# Patient Record
Sex: FEMALE | Race: BLACK OR AFRICAN AMERICAN | NOT HISPANIC OR LATINO
[De-identification: names, ages, dates, MRNs, and addresses within clinical notes are randomized per-mention and may not be internally consistent; named-entity substitution may affect disease eponyms.]

---

## 2018-09-18 ENCOUNTER — APPOINTMENT (OUTPATIENT)
Dept: SURGERY | Facility: CLINIC | Age: 68
End: 2018-09-18
Payer: MEDICARE

## 2018-09-18 VITALS
HEIGHT: 61 IN | TEMPERATURE: 97.9 F | BODY MASS INDEX: 27.19 KG/M2 | SYSTOLIC BLOOD PRESSURE: 137 MMHG | DIASTOLIC BLOOD PRESSURE: 84 MMHG | WEIGHT: 144 LBS | HEART RATE: 81 BPM

## 2018-09-18 PROCEDURE — 99205 OFFICE O/P NEW HI 60 MIN: CPT

## 2018-10-01 ENCOUNTER — OUTPATIENT (OUTPATIENT)
Dept: OUTPATIENT SERVICES | Facility: HOSPITAL | Age: 68
LOS: 1 days | End: 2018-10-01
Payer: MEDICARE

## 2018-10-01 VITALS
TEMPERATURE: 98 F | OXYGEN SATURATION: 100 % | HEIGHT: 57 IN | RESPIRATION RATE: 16 BRPM | SYSTOLIC BLOOD PRESSURE: 124 MMHG | WEIGHT: 145.95 LBS | HEART RATE: 75 BPM | DIASTOLIC BLOOD PRESSURE: 81 MMHG

## 2018-10-01 DIAGNOSIS — Z01.818 ENCOUNTER FOR OTHER PREPROCEDURAL EXAMINATION: ICD-10-CM

## 2018-10-01 DIAGNOSIS — Z98.890 OTHER SPECIFIED POSTPROCEDURAL STATES: Chronic | ICD-10-CM

## 2018-10-01 DIAGNOSIS — D24.2 BENIGN NEOPLASM OF LEFT BREAST: ICD-10-CM

## 2018-10-01 DIAGNOSIS — I10 ESSENTIAL (PRIMARY) HYPERTENSION: ICD-10-CM

## 2018-10-01 DIAGNOSIS — Z87.39 PERSONAL HISTORY OF OTHER DISEASES OF THE MUSCULOSKELETAL SYSTEM AND CONNECTIVE TISSUE: Chronic | ICD-10-CM

## 2018-10-01 DIAGNOSIS — N60.12 DIFFUSE CYSTIC MASTOPATHY OF LEFT BREAST: Chronic | ICD-10-CM

## 2018-10-01 PROCEDURE — 86901 BLOOD TYPING SEROLOGIC RH(D): CPT

## 2018-10-01 PROCEDURE — G0463: CPT

## 2018-10-01 PROCEDURE — 86900 BLOOD TYPING SEROLOGIC ABO: CPT

## 2018-10-01 PROCEDURE — 86850 RBC ANTIBODY SCREEN: CPT

## 2018-10-01 NOTE — H&P PST ADULT - NEGATIVE GENERAL SYMPTOMS
no anorexia/no polyuria/no fatigue/no weight loss/no polyphagia/no malaise/no chills/no fever/no polydipsia

## 2018-10-01 NOTE — H&P PST ADULT - PSH
Ductal papillomatosis of breast, left  s/p excision 1980's  H/O breast surgery  right breast calcifications removed, breast marker placed over 20 years ago  H/O surgical biopsy  left breast 7/2018  History of scoliosis  s/p surgical repair childhood

## 2018-10-01 NOTE — H&P PST ADULT - NEGATIVE CARDIOVASCULAR SYMPTOMS
no peripheral edema/no paroxysmal nocturnal dyspnea/no chest pain/no orthopnea/no palpitations/no claudication/no dyspnea on exertion

## 2018-10-01 NOTE — H&P PST ADULT - MUSCULOSKELETAL
negative No joint pain, swelling or deformity; no limitation of movement detailed exam Left hip/decreased ROM due to pain details…

## 2018-10-01 NOTE — H&P PST ADULT - ASSESSMENT
69 y/o female with PMH of hypertension, HLD, LLE sciatica diagnosed with benign neoplasm of left breast scheduled for left breast needle localization and excision of left breast mass 10/4/2018

## 2018-10-01 NOTE — H&P PST ADULT - NEUROLOGICAL
negative Alert & oriented; no sensory, motor or coordination deficits, normal reflexes detailed exam details…

## 2018-10-01 NOTE — H&P PST ADULT - PROBLEM SELECTOR PLAN 1
Scheduled for left breast needle localization and excision of left breast mass 10/4/2018. Preoperative instructions discussed and given to patient. Verbalized understanding of same

## 2018-10-01 NOTE — H&P PST ADULT - RS GEN PE MLT RESP DETAILS PC
respirations non-labored/clear to auscultation bilaterally/no rhonchi/good air movement/airway patent/no subcutaneous emphysema/no chest wall tenderness/normal/no intercostal retractions/no rales/breath sounds equal/no wheezes no chest wall tenderness/no intercostal retractions/no rales/clear to auscultation bilaterally/breath sounds equal/respirations non-labored/no wheezes/good air movement/airway patent/no rhonchi/no subcutaneous emphysema

## 2018-10-01 NOTE — H&P PST ADULT - HISTORY OF PRESENT ILLNESS
67 y/o female with PMH of hypertension and HLD is here today for presurgical evaluation. She was diagnosed benign neoplasm of left breast and is scheduled for left breast needle localization and excision of left breast mass 10/4/2018 69 y/o female with PMH of hypertension and HLD is here today for presurgical evaluation. She was diagnosed with benign neoplasm of left breast and is scheduled for left breast needle localization and excision of left breast mass 10/4/2018

## 2018-10-01 NOTE — H&P PST ADULT - PMH
Benign neoplasm breast, left    Breast calcification seen on mammogram  right breast  Essential hypertension    Hyperlipidemia, unspecified hyperlipidemia type    Sciatica of left side    Scoliosis, unspecified scoliosis type, unspecified spinal region

## 2018-10-01 NOTE — H&P PST ADULT - NSANTHOSAYNRD_GEN_A_CORE
No. WINSOME screening performed.  STOP BANG Legend: 0-2 = LOW Risk; 3-4 = INTERMEDIATE Risk; 5-8 = HIGH Risk

## 2018-10-01 NOTE — H&P PST ADULT - PROBLEM SELECTOR PLAN 2
Instructed to take antihypertensive medication (s) as prescribed with a sip of water the morning of surgery. Follow up with PCP/Specialist post-surgery for management of hypertension and other chronic conditions Instructed to take antihypertensive medication (s) as prescribed with a sip of water the day of surgery. Follow up with PCP/Specialist post-surgery for management of hypertension and other chronic conditions

## 2018-10-04 ENCOUNTER — TRANSCRIPTION ENCOUNTER (OUTPATIENT)
Age: 68
End: 2018-10-04

## 2018-10-05 ENCOUNTER — OUTPATIENT (OUTPATIENT)
Dept: OUTPATIENT SERVICES | Facility: HOSPITAL | Age: 68
LOS: 1 days | End: 2018-10-05
Payer: MEDICARE

## 2018-10-05 ENCOUNTER — RESULT REVIEW (OUTPATIENT)
Age: 68
End: 2018-10-05

## 2018-10-05 VITALS
DIASTOLIC BLOOD PRESSURE: 76 MMHG | OXYGEN SATURATION: 96 % | TEMPERATURE: 97 F | WEIGHT: 145.95 LBS | HEART RATE: 71 BPM | SYSTOLIC BLOOD PRESSURE: 117 MMHG | HEIGHT: 57 IN | RESPIRATION RATE: 16 BRPM

## 2018-10-05 VITALS
SYSTOLIC BLOOD PRESSURE: 128 MMHG | OXYGEN SATURATION: 98 % | RESPIRATION RATE: 16 BRPM | HEART RATE: 67 BPM | DIASTOLIC BLOOD PRESSURE: 71 MMHG | TEMPERATURE: 98 F

## 2018-10-05 DIAGNOSIS — Z98.890 OTHER SPECIFIED POSTPROCEDURAL STATES: Chronic | ICD-10-CM

## 2018-10-05 DIAGNOSIS — Z87.39 PERSONAL HISTORY OF OTHER DISEASES OF THE MUSCULOSKELETAL SYSTEM AND CONNECTIVE TISSUE: Chronic | ICD-10-CM

## 2018-10-05 DIAGNOSIS — N60.12 DIFFUSE CYSTIC MASTOPATHY OF LEFT BREAST: Chronic | ICD-10-CM

## 2018-10-05 DIAGNOSIS — Z01.818 ENCOUNTER FOR OTHER PREPROCEDURAL EXAMINATION: ICD-10-CM

## 2018-10-05 DIAGNOSIS — D24.2 BENIGN NEOPLASM OF LEFT BREAST: ICD-10-CM

## 2018-10-05 PROCEDURE — 19281 PERQ DEVICE BREAST 1ST IMAG: CPT

## 2018-10-05 PROCEDURE — 19125 EXCISION BREAST LESION: CPT | Mod: LT

## 2018-10-05 PROCEDURE — 86850 RBC ANTIBODY SCREEN: CPT

## 2018-10-05 PROCEDURE — 76098 X-RAY EXAM SURGICAL SPECIMEN: CPT | Mod: 26

## 2018-10-05 PROCEDURE — 88307 TISSUE EXAM BY PATHOLOGIST: CPT | Mod: 26

## 2018-10-05 PROCEDURE — 86900 BLOOD TYPING SEROLOGIC ABO: CPT

## 2018-10-05 PROCEDURE — 19281 PERQ DEVICE BREAST 1ST IMAG: CPT | Mod: LT

## 2018-10-05 PROCEDURE — 86901 BLOOD TYPING SEROLOGIC RH(D): CPT

## 2018-10-05 PROCEDURE — 88307 TISSUE EXAM BY PATHOLOGIST: CPT

## 2018-10-05 PROCEDURE — ZZZZZ: CPT

## 2018-10-05 PROCEDURE — 76098 X-RAY EXAM SURGICAL SPECIMEN: CPT

## 2018-10-05 RX ORDER — SODIUM CHLORIDE 9 MG/ML
1000 INJECTION, SOLUTION INTRAVENOUS
Qty: 0 | Refills: 0 | Status: DISCONTINUED | OUTPATIENT
Start: 2018-10-05 | End: 2018-10-08

## 2018-10-05 RX ORDER — SODIUM CHLORIDE 9 MG/ML
3 INJECTION INTRAMUSCULAR; INTRAVENOUS; SUBCUTANEOUS EVERY 8 HOURS
Qty: 0 | Refills: 0 | Status: DISCONTINUED | OUTPATIENT
Start: 2018-10-05 | End: 2018-10-05

## 2018-10-05 RX ORDER — HYDROMORPHONE HYDROCHLORIDE 2 MG/ML
0.5 INJECTION INTRAMUSCULAR; INTRAVENOUS; SUBCUTANEOUS
Qty: 0 | Refills: 0 | Status: DISCONTINUED | OUTPATIENT
Start: 2018-10-05 | End: 2018-10-08

## 2018-10-05 RX ORDER — SODIUM CHLORIDE 9 MG/ML
1000 INJECTION, SOLUTION INTRAVENOUS
Qty: 0 | Refills: 0 | Status: DISCONTINUED | OUTPATIENT
Start: 2018-10-05 | End: 2018-10-13

## 2018-10-05 RX ORDER — ONDANSETRON 8 MG/1
4 TABLET, FILM COATED ORAL ONCE
Qty: 0 | Refills: 0 | Status: DISCONTINUED | OUTPATIENT
Start: 2018-10-05 | End: 2018-10-08

## 2018-10-05 RX ORDER — OXYCODONE AND ACETAMINOPHEN 5; 325 MG/1; MG/1
1 TABLET ORAL EVERY 4 HOURS
Qty: 0 | Refills: 0 | Status: DISCONTINUED | OUTPATIENT
Start: 2018-10-05 | End: 2018-10-05

## 2018-10-05 RX ORDER — ACETAMINOPHEN 500 MG
1000 TABLET ORAL ONCE
Qty: 0 | Refills: 0 | Status: DISCONTINUED | OUTPATIENT
Start: 2018-10-05 | End: 2018-10-08

## 2018-10-05 RX ADMIN — SODIUM CHLORIDE 3 MILLILITER(S): 9 INJECTION INTRAMUSCULAR; INTRAVENOUS; SUBCUTANEOUS at 11:11

## 2018-10-05 NOTE — BRIEF OPERATIVE NOTE - PROCEDURE
<<-----Click on this checkbox to enter Procedure Breast mass, local excision  10/05/2018  with needle localization  Active  DPATERNOSTER

## 2018-10-05 NOTE — ASU DISCHARGE PLAN (ADULT/PEDIATRIC). - DRESSING FT
keep dressing dry x2 days then may remove and shower. leave white strips on skin until they fall off

## 2018-10-05 NOTE — ASU DISCHARGE PLAN (ADULT/PEDIATRIC). - NOTIFY
Bleeding that does not stop Swelling that continues/Bleeding that does not stop/Fever greater than 101

## 2018-10-05 NOTE — ASU DISCHARGE PLAN (ADULT/PEDIATRIC). - MEDICATION SUMMARY - MEDICATIONS TO TAKE
I will START or STAY ON the medications listed below when I get home from the hospital:    Percocet 5/325 oral tablet  -- 1 tab(s) by mouth every 6 hours MDD:4  -- Caution federal law prohibits the transfer of this drug to any person other  than the person for whom it was prescribed.  May cause drowsiness.  Alcohol may intensify this effect.  Use care when operating dangerous machinery.  This prescription cannot be refilled.  This product contains acetaminophen.  Do not use  with any other product containing acetaminophen to prevent possible liver damage.  Using more of this medication than prescribed may cause serious breathing problems.    -- Indication: For pain    ramipril 5 mg oral capsule  -- 1 cap(s) by mouth once a day  -- Indication: For as previously perscribed    Lipitor 10 mg oral tablet  -- 1 tab(s) by mouth once a day  -- Indication: For as previously perscribed    hydroCHLOROthiazide 25 mg oral tablet  -- 1 tab(s) by mouth once a day  -- Indication: For as previously perscribed    potassium chloride 10 mEq oral capsule, extended release  -- 1 cap(s) by mouth once a day  -- Indication: For as previously perscribed

## 2018-10-09 PROBLEM — Z80.3 FAMILY HISTORY OF BREAST CANCER: Status: ACTIVE | Noted: 2018-09-18

## 2018-10-09 PROBLEM — D24.2 BENIGN NEOPLASM OF LEFT BREAST: Chronic | Status: ACTIVE | Noted: 2018-10-01

## 2018-10-09 PROBLEM — Z63.4 WIDOWED: Status: ACTIVE | Noted: 2018-09-18

## 2018-10-09 PROBLEM — Z86.79 HISTORY OF HYPERTENSION: Status: RESOLVED | Noted: 2018-09-18 | Resolved: 2018-10-09

## 2018-10-09 PROBLEM — R92.1 MAMMOGRAPHIC CALCIFICATION FOUND ON DIAGNOSTIC IMAGING OF BREAST: Chronic | Status: ACTIVE | Noted: 2018-10-01

## 2018-10-09 PROBLEM — I10 ESSENTIAL (PRIMARY) HYPERTENSION: Chronic | Status: ACTIVE | Noted: 2018-10-01

## 2018-10-09 PROBLEM — Z86.39 HISTORY OF HIGH CHOLESTEROL: Status: RESOLVED | Noted: 2018-09-18 | Resolved: 2018-10-09

## 2018-10-09 PROBLEM — M41.9 SCOLIOSIS, UNSPECIFIED: Chronic | Status: ACTIVE | Noted: 2018-10-01

## 2018-10-09 PROBLEM — E78.5 HYPERLIPIDEMIA, UNSPECIFIED: Chronic | Status: ACTIVE | Noted: 2018-10-01

## 2018-10-09 PROBLEM — Z82.0 FAMILY HISTORY OF ALZHEIMER'S DISEASE: Status: ACTIVE | Noted: 2018-09-18

## 2018-10-09 PROBLEM — M54.32 SCIATICA, LEFT SIDE: Chronic | Status: ACTIVE | Noted: 2018-10-01

## 2018-10-10 LAB — SURGICAL PATHOLOGY STUDY: SIGNIFICANT CHANGE UP

## 2018-10-16 ENCOUNTER — APPOINTMENT (OUTPATIENT)
Dept: SURGERY | Facility: CLINIC | Age: 68
End: 2018-10-16
Payer: MEDICARE

## 2018-10-16 DIAGNOSIS — Z86.79 PERSONAL HISTORY OF OTHER DISEASES OF THE CIRCULATORY SYSTEM: ICD-10-CM

## 2018-10-16 DIAGNOSIS — Z86.39 PERSONAL HISTORY OF OTHER ENDOCRINE, NUTRITIONAL AND METABOLIC DISEASE: ICD-10-CM

## 2018-10-16 DIAGNOSIS — Z82.0 FAMILY HISTORY OF EPILEPSY AND OTHER DISEASES OF THE NERVOUS SYSTEM: ICD-10-CM

## 2018-10-16 DIAGNOSIS — Z80.3 FAMILY HISTORY OF MALIGNANT NEOPLASM OF BREAST: ICD-10-CM

## 2018-10-16 DIAGNOSIS — Z63.4 DISAPPEARANCE AND DEATH OF FAMILY MEMBER: ICD-10-CM

## 2018-10-16 PROCEDURE — 99024 POSTOP FOLLOW-UP VISIT: CPT

## 2018-10-16 RX ORDER — HYDROCHLOROTHIAZIDE 12.5 MG/1
TABLET ORAL
Refills: 0 | Status: ACTIVE | COMMUNITY

## 2018-10-16 RX ORDER — RAMIPRIL 5 MG/1
CAPSULE ORAL
Refills: 0 | Status: ACTIVE | COMMUNITY

## 2018-10-16 RX ORDER — ATORVASTATIN CALCIUM 80 MG/1
TABLET, FILM COATED ORAL
Refills: 0 | Status: ACTIVE | COMMUNITY

## 2018-10-16 SDOH — SOCIAL STABILITY - SOCIAL INSECURITY: DISSAPEARANCE AND DEATH OF FAMILY MEMBER: Z63.4

## 2019-01-15 ENCOUNTER — APPOINTMENT (OUTPATIENT)
Dept: SURGERY | Facility: CLINIC | Age: 69
End: 2019-01-15
Payer: MEDICARE

## 2019-01-15 VITALS
DIASTOLIC BLOOD PRESSURE: 84 MMHG | BODY MASS INDEX: 26.62 KG/M2 | HEIGHT: 61 IN | HEART RATE: 73 BPM | SYSTOLIC BLOOD PRESSURE: 150 MMHG | WEIGHT: 141 LBS | TEMPERATURE: 97 F

## 2019-01-15 PROCEDURE — 99213 OFFICE O/P EST LOW 20 MIN: CPT

## 2019-01-15 NOTE — HISTORY OF PRESENT ILLNESS
[de-identified] : Patient is s/p excision of left breast mass with needle localization on 10/05/2018. today patient offers no complaints. patient reports no fever, chills,  or  pain.  Surgical wound healed well. patient reports no new medical events. \par

## 2019-01-15 NOTE — ASSESSMENT
[FreeTextEntry1] : Impression: breast mass \par Patient is s/p excision of left breast mass with needle localization on 10/05/2018. today patient offers no complaints. patient reports no fever, chills,  or  pain.  Surgical wound healed well. patient reports no new medical events.  patient will return to us in 3 months and we will make arrangements for her breast imaging. \par

## 2019-01-15 NOTE — PLAN
[FreeTextEntry1] : \par patient will follow up in 3 months or if needed. Warning signs, follow up, and restrictions were discussed with the patient.

## 2019-01-15 NOTE — PHYSICAL EXAM
[Alert] : alert [Oriented to Person] : oriented to person [Oriented to Place] : oriented to place [Oriented to Time] : oriented to time [Calm] : calm [de-identified] : The patient is alert, well-groomed, and cheerful. [de-identified] :  Neck supple. Trachea midline. Thyroid isthmus barely palpable, lobes not felt. [de-identified] : Thorax symmetric with good excursion. Lungs resonant. Breath sounds vesicular with no added sounds. [de-identified] : left breast Wound healed  well.

## 2019-09-10 ENCOUNTER — OUTPATIENT (OUTPATIENT)
Dept: OUTPATIENT SERVICES | Facility: HOSPITAL | Age: 69
LOS: 1 days | End: 2019-09-10
Payer: MEDICARE

## 2019-09-10 ENCOUNTER — APPOINTMENT (OUTPATIENT)
Dept: CT IMAGING | Facility: IMAGING CENTER | Age: 69
End: 2019-09-10
Payer: MEDICARE

## 2019-09-10 DIAGNOSIS — Z98.890 OTHER SPECIFIED POSTPROCEDURAL STATES: Chronic | ICD-10-CM

## 2019-09-10 DIAGNOSIS — Z87.39 PERSONAL HISTORY OF OTHER DISEASES OF THE MUSCULOSKELETAL SYSTEM AND CONNECTIVE TISSUE: Chronic | ICD-10-CM

## 2019-09-10 DIAGNOSIS — N60.12 DIFFUSE CYSTIC MASTOPATHY OF LEFT BREAST: Chronic | ICD-10-CM

## 2019-09-10 DIAGNOSIS — Z00.8 ENCOUNTER FOR OTHER GENERAL EXAMINATION: ICD-10-CM

## 2019-09-10 PROCEDURE — 74177 CT ABD & PELVIS W/CONTRAST: CPT | Mod: 26

## 2019-09-10 PROCEDURE — 74177 CT ABD & PELVIS W/CONTRAST: CPT

## 2019-09-16 ENCOUNTER — RESULT REVIEW (OUTPATIENT)
Age: 69
End: 2019-09-16

## 2019-09-19 ENCOUNTER — APPOINTMENT (OUTPATIENT)
Dept: GYNECOLOGIC ONCOLOGY | Facility: CLINIC | Age: 69
End: 2019-09-19
Payer: MEDICARE

## 2019-09-19 VITALS
SYSTOLIC BLOOD PRESSURE: 158 MMHG | WEIGHT: 137.25 LBS | BODY MASS INDEX: 25.91 KG/M2 | DIASTOLIC BLOOD PRESSURE: 80 MMHG | HEART RATE: 84 BPM | HEIGHT: 61 IN

## 2019-09-19 DIAGNOSIS — N85.8 OTHER SPECIFIED NONINFLAMMATORY DISORDERS OF UTERUS: ICD-10-CM

## 2019-09-19 PROCEDURE — 99205 OFFICE O/P NEW HI 60 MIN: CPT

## 2019-09-23 ENCOUNTER — APPOINTMENT (OUTPATIENT)
Dept: SURGERY | Facility: CLINIC | Age: 69
End: 2019-09-23
Payer: MEDICARE

## 2019-09-23 VITALS
HEIGHT: 61 IN | BODY MASS INDEX: 25.86 KG/M2 | OXYGEN SATURATION: 100 % | DIASTOLIC BLOOD PRESSURE: 94 MMHG | HEART RATE: 78 BPM | TEMPERATURE: 97.5 F | WEIGHT: 137 LBS | SYSTOLIC BLOOD PRESSURE: 161 MMHG

## 2019-09-23 DIAGNOSIS — Z80.3 FAMILY HISTORY OF MALIGNANT NEOPLASM OF BREAST: ICD-10-CM

## 2019-09-23 PROCEDURE — 99213 OFFICE O/P EST LOW 20 MIN: CPT

## 2019-09-23 NOTE — DATA REVIEWED
[FreeTextEntry1] : ADRIEL ANDRADE                         1\par \par \par \par Surgical Consult Report\par \par \par \par \par Final Diagnosis\par Submitting Institution:  [facility]\par Date of Procedure:  08/22/2019\par \par 1. Uterus, endometrium, curettings\par - Minute fragments of markedly atypical glandular\par epithelium, highly suspicious for adenocarcinoma, see note\par \par Note: Provided outside immunohistochemical staining for p53\par demonstrates wild type nuclear expression.\par \par 2. Uterus, endometrium, polyp, polypectomy\par - Minute fragments of markedly atypical glandular\par epithelium, highly suspicious for adenocarcinoma, see note\par \par Note: Provided outside immunohistochemical staining for p53\par demonstrates wild type nuclear expression.\par \par Verified by: Joseph Trujillo MD\par (Electronic Signature)\par Reported on: 09/17/19 12:38 EDT, [address] [place] [address], [place], [place]\par [address]\par _________________________________________________________________\par \par Clinical History\par B: Polyp. C: Fibroid.

## 2019-09-23 NOTE — PHYSICAL EXAM
[Alert] : alert [Oriented to Person] : oriented to person [Oriented to Time] : oriented to time [Oriented to Place] : oriented to place [Calm] : calm [de-identified] : The patient is alert, well-groomed, and cheerful.\par   [de-identified] :   anicteric.  Nasal mucosa pink, septum midline. Oral mucosa pink.  Tongue midline, Pharynx without exudates.\par   [de-identified] :  Neck supple. Trachea midline. Thyroid isthmus barely palpable, lobes not felt.\par   [de-identified] : left breast scar with retraction. No chest deformity, asymmetry. Normal contours. No nodules, masses, tenderness, or axillary adenopathy. No nipple discharge.\par

## 2019-09-23 NOTE — HISTORY OF PRESENT ILLNESS
[de-identified] : Patient is s/p excision of left breast mass with needle localization on 10/05/2018. pathology results were consistent with Fibrocystic changes with intraductal papillary hyperplasia.   patient presenting today for a breast exam and to discuss the results of her recent  breast imaging. Patient had  right breast  Mammogram on 08/28/2019 Deemed BIRADS 4 category.  Patient denies any trauma and she has no nipple discharge. Patient denies any fever, night sweats or loss of appetite.    There is  family history of breast carcinoma in her mother.   Patient is on no hormonal replacement therapy.  [de-identified] : Patient is s/p  Uterus, endometrium  polypectomy on 09/16/2019. pathology results are consistent with atypical glandular\par epithelium, highly suspicious for adenocarcinoma\par

## 2019-09-23 NOTE — PLAN
[FreeTextEntry1] : Patient is presenting for a follow up visit.  Patient is doing well. Results of her breast imaging and physical examination findings were discussed in details.   Patient was advised to have   Right     breast stereotactic biopsy and return after the tests. Importance of monthly self-breast examination was reinforced.  Patient's questions and concerns addressed to patient's satisfaction.\par \par

## 2019-09-24 ENCOUNTER — OUTPATIENT (OUTPATIENT)
Dept: OUTPATIENT SERVICES | Facility: HOSPITAL | Age: 69
LOS: 1 days | End: 2019-09-24
Payer: MEDICARE

## 2019-09-24 VITALS
SYSTOLIC BLOOD PRESSURE: 130 MMHG | DIASTOLIC BLOOD PRESSURE: 80 MMHG | HEART RATE: 70 BPM | HEIGHT: 61 IN | RESPIRATION RATE: 16 BRPM | WEIGHT: 136.91 LBS | TEMPERATURE: 97 F | OXYGEN SATURATION: 97 %

## 2019-09-24 DIAGNOSIS — Z87.39 PERSONAL HISTORY OF OTHER DISEASES OF THE MUSCULOSKELETAL SYSTEM AND CONNECTIVE TISSUE: Chronic | ICD-10-CM

## 2019-09-24 DIAGNOSIS — Z98.890 OTHER SPECIFIED POSTPROCEDURAL STATES: Chronic | ICD-10-CM

## 2019-09-24 DIAGNOSIS — N85.9 NONINFLAMMATORY DISORDER OF UTERUS, UNSPECIFIED: ICD-10-CM

## 2019-09-24 DIAGNOSIS — N85.8 OTHER SPECIFIED NONINFLAMMATORY DISORDERS OF UTERUS: ICD-10-CM

## 2019-09-24 DIAGNOSIS — N60.12 DIFFUSE CYSTIC MASTOPATHY OF LEFT BREAST: Chronic | ICD-10-CM

## 2019-09-24 LAB
ALBUMIN SERPL ELPH-MCNC: 4.3 G/DL — SIGNIFICANT CHANGE UP (ref 3.3–5)
ALP SERPL-CCNC: 87 U/L — SIGNIFICANT CHANGE UP (ref 40–120)
ALT FLD-CCNC: 7 U/L — SIGNIFICANT CHANGE UP (ref 4–33)
ANION GAP SERPL CALC-SCNC: 17 MMO/L — HIGH (ref 7–14)
AST SERPL-CCNC: 19 U/L — SIGNIFICANT CHANGE UP (ref 4–32)
BILIRUB SERPL-MCNC: 0.5 MG/DL — SIGNIFICANT CHANGE UP (ref 0.2–1.2)
BLD GP AB SCN SERPL QL: NEGATIVE — SIGNIFICANT CHANGE UP
BUN SERPL-MCNC: 14 MG/DL — SIGNIFICANT CHANGE UP (ref 7–23)
CALCIUM SERPL-MCNC: 9.9 MG/DL — SIGNIFICANT CHANGE UP (ref 8.4–10.5)
CHLORIDE SERPL-SCNC: 99 MMOL/L — SIGNIFICANT CHANGE UP (ref 98–107)
CO2 SERPL-SCNC: 27 MMOL/L — SIGNIFICANT CHANGE UP (ref 22–31)
CREAT SERPL-MCNC: 0.77 MG/DL — SIGNIFICANT CHANGE UP (ref 0.5–1.3)
GLUCOSE SERPL-MCNC: 82 MG/DL — SIGNIFICANT CHANGE UP (ref 70–99)
HBA1C BLD-MCNC: 5.7 % — HIGH (ref 4–5.6)
HCT VFR BLD CALC: 42.4 % — SIGNIFICANT CHANGE UP (ref 34.5–45)
HGB BLD-MCNC: 13 G/DL — SIGNIFICANT CHANGE UP (ref 11.5–15.5)
MCHC RBC-ENTMCNC: 26.9 PG — LOW (ref 27–34)
MCHC RBC-ENTMCNC: 30.7 % — LOW (ref 32–36)
MCV RBC AUTO: 87.6 FL — SIGNIFICANT CHANGE UP (ref 80–100)
NRBC # FLD: 0 K/UL — SIGNIFICANT CHANGE UP (ref 0–0)
PLATELET # BLD AUTO: 210 K/UL — SIGNIFICANT CHANGE UP (ref 150–400)
PMV BLD: 12.4 FL — SIGNIFICANT CHANGE UP (ref 7–13)
POTASSIUM SERPL-MCNC: 3.4 MMOL/L — LOW (ref 3.5–5.3)
POTASSIUM SERPL-SCNC: 3.4 MMOL/L — LOW (ref 3.5–5.3)
PROT SERPL-MCNC: 8.3 G/DL — SIGNIFICANT CHANGE UP (ref 6–8.3)
RBC # BLD: 4.84 M/UL — SIGNIFICANT CHANGE UP (ref 3.8–5.2)
RBC # FLD: 14.3 % — SIGNIFICANT CHANGE UP (ref 10.3–14.5)
RH IG SCN BLD-IMP: POSITIVE — SIGNIFICANT CHANGE UP
SODIUM SERPL-SCNC: 143 MMOL/L — SIGNIFICANT CHANGE UP (ref 135–145)
WBC # BLD: 2.96 K/UL — LOW (ref 3.8–10.5)
WBC # FLD AUTO: 2.96 K/UL — LOW (ref 3.8–10.5)

## 2019-09-24 PROCEDURE — 93010 ELECTROCARDIOGRAM REPORT: CPT

## 2019-09-24 RX ORDER — SODIUM CHLORIDE 9 MG/ML
3 INJECTION INTRAMUSCULAR; INTRAVENOUS; SUBCUTANEOUS EVERY 8 HOURS
Refills: 0 | Status: DISCONTINUED | OUTPATIENT
Start: 2019-10-01 | End: 2019-10-20

## 2019-09-24 NOTE — H&P PST ADULT - NSICDXPASTMEDICALHX_GEN_ALL_CORE_FT
PAST MEDICAL HISTORY:  Benign neoplasm breast, left     Breast calcification seen on mammogram right breast    Essential hypertension     Hyperlipidemia, unspecified hyperlipidemia type     Sciatica of left side     Scoliosis, unspecified scoliosis type, unspecified spinal region

## 2019-09-24 NOTE — H&P PST ADULT - HISTORY OF PRESENT ILLNESS
70 y/o female s/p recent GYN visit.  Polyp discovered on cervix, cancer cells discovered on biopsy per pt.  Scheduled for Robotic Assisted Total Laparoscopic hysterectomy BSO, possible staging 10/1/19.

## 2019-09-24 NOTE — H&P PST ADULT - NSICDXFAMILYHX_GEN_ALL_CORE_FT
FAMILY HISTORY:  Mother  Still living? No  Family history of Alzheimer's disease, Age at diagnosis: Age Unknown

## 2019-09-24 NOTE — H&P PST ADULT - NEGATIVE GENERAL SYMPTOMS
no polydipsia/no fever/no chills/no anorexia/no polyphagia/no fatigue/no polyuria/no malaise/no weight loss

## 2019-09-24 NOTE — H&P PST ADULT - NSICDXPASTSURGICALHX_GEN_ALL_CORE_FT
PAST SURGICAL HISTORY:  Ductal papillomatosis of breast, left s/p excision 1980's    H/O breast surgery right breast calcifications removed, breast marker placed over 20 years ago    H/O surgical biopsy left breast 7/2018    History of scoliosis s/p surgical repair childhood

## 2019-09-24 NOTE — H&P PST ADULT - ASSESSMENT
68 y/o female s/p recent GYN visit.  Polyp discovered on cervix, cancer cells discovered on biopsy per pt.  Scheduled for Robotic Assisted Total Laparoscopic hysterectomy BSO, possible staging 10/1/19.

## 2019-09-24 NOTE — H&P PST ADULT - NSICDXPROBLEM_GEN_ALL_CORE_FT
PROBLEM DIAGNOSES  Problem: Disorder of uterus  Assessment and Plan: Robotic Assisted Total Laparoscopic hysterectomy BSO, possible staging 10/1/19.   CBC cMP HgbA1C t&S EKG  Preop instructions and antibacterial soap given and explained (verbal and written), with teach back.   Pt reports she was seen by PMD for pre-op eval, requested on chart

## 2019-09-24 NOTE — H&P PST ADULT - NEGATIVE CARDIOVASCULAR SYMPTOMS
no chest pain/no paroxysmal nocturnal dyspnea/no dyspnea on exertion/no palpitations/no orthopnea/no peripheral edema/no claudication

## 2019-09-30 ENCOUNTER — TRANSCRIPTION ENCOUNTER (OUTPATIENT)
Age: 69
End: 2019-09-30

## 2019-10-01 ENCOUNTER — APPOINTMENT (OUTPATIENT)
Dept: GYNECOLOGIC ONCOLOGY | Facility: HOSPITAL | Age: 69
End: 2019-10-01

## 2019-10-01 ENCOUNTER — RESULT REVIEW (OUTPATIENT)
Age: 69
End: 2019-10-01

## 2019-10-01 ENCOUNTER — OUTPATIENT (OUTPATIENT)
Dept: OUTPATIENT SERVICES | Facility: HOSPITAL | Age: 69
LOS: 1 days | Discharge: HOME | End: 2019-10-01
Payer: MEDICARE

## 2019-10-01 VITALS
SYSTOLIC BLOOD PRESSURE: 128 MMHG | HEART RATE: 72 BPM | OXYGEN SATURATION: 98 % | RESPIRATION RATE: 16 BRPM | DIASTOLIC BLOOD PRESSURE: 82 MMHG

## 2019-10-01 VITALS
TEMPERATURE: 99 F | RESPIRATION RATE: 18 BRPM | HEART RATE: 77 BPM | DIASTOLIC BLOOD PRESSURE: 76 MMHG | WEIGHT: 136.91 LBS | OXYGEN SATURATION: 97 % | SYSTOLIC BLOOD PRESSURE: 126 MMHG | HEIGHT: 61 IN

## 2019-10-01 DIAGNOSIS — N60.12 DIFFUSE CYSTIC MASTOPATHY OF LEFT BREAST: Chronic | ICD-10-CM

## 2019-10-01 DIAGNOSIS — N85.8 OTHER SPECIFIED NONINFLAMMATORY DISORDERS OF UTERUS: ICD-10-CM

## 2019-10-01 DIAGNOSIS — Z87.39 PERSONAL HISTORY OF OTHER DISEASES OF THE MUSCULOSKELETAL SYSTEM AND CONNECTIVE TISSUE: Chronic | ICD-10-CM

## 2019-10-01 DIAGNOSIS — Z98.890 OTHER SPECIFIED POSTPROCEDURAL STATES: Chronic | ICD-10-CM

## 2019-10-01 LAB
GLUCOSE BLDC GLUCOMTR-MCNC: 126 MG/DL — HIGH (ref 70–99)
GLUCOSE BLDC GLUCOMTR-MCNC: 91 MG/DL — SIGNIFICANT CHANGE UP (ref 70–99)
GLUCOSE BLDV-MCNC: 109 MG/DL — HIGH (ref 70–99)
HCT VFR BLD CALC: 36.6 % — SIGNIFICANT CHANGE UP (ref 34.5–45)
HGB BLD-MCNC: 11.8 G/DL — SIGNIFICANT CHANGE UP (ref 11.5–15.5)
MCHC RBC-ENTMCNC: 27.4 PG — SIGNIFICANT CHANGE UP (ref 27–34)
MCHC RBC-ENTMCNC: 32.2 % — SIGNIFICANT CHANGE UP (ref 32–36)
MCV RBC AUTO: 85.1 FL — SIGNIFICANT CHANGE UP (ref 80–100)
NRBC # FLD: 0 K/UL — SIGNIFICANT CHANGE UP (ref 0–0)
PLATELET # BLD AUTO: 185 K/UL — SIGNIFICANT CHANGE UP (ref 150–400)
PMV BLD: 11.9 FL — SIGNIFICANT CHANGE UP (ref 7–13)
POTASSIUM BLDV-SCNC: 4.2 MMOL/L — SIGNIFICANT CHANGE UP (ref 3.4–4.5)
RBC # BLD: 4.3 M/UL — SIGNIFICANT CHANGE UP (ref 3.8–5.2)
RBC # FLD: 14.1 % — SIGNIFICANT CHANGE UP (ref 10.3–14.5)
RH IG SCN BLD-IMP: POSITIVE — SIGNIFICANT CHANGE UP
WBC # BLD: 7.53 K/UL — SIGNIFICANT CHANGE UP (ref 3.8–10.5)
WBC # FLD AUTO: 7.53 K/UL — SIGNIFICANT CHANGE UP (ref 3.8–10.5)

## 2019-10-01 PROCEDURE — 88331 PATH CONSLTJ SURG 1 BLK 1SPC: CPT | Mod: 26

## 2019-10-01 PROCEDURE — 88341 IMHCHEM/IMCYTCHM EA ADD ANTB: CPT | Mod: 26,59

## 2019-10-01 PROCEDURE — 58572 TLH UTERUS OVER 250 G: CPT | Mod: GC

## 2019-10-01 PROCEDURE — 88112 CYTOPATH CELL ENHANCE TECH: CPT | Mod: 26

## 2019-10-01 PROCEDURE — 88342 IMHCHEM/IMCYTCHM 1ST ANTB: CPT | Mod: 26,59

## 2019-10-01 PROCEDURE — 88309 TISSUE EXAM BY PATHOLOGIST: CPT | Mod: 26

## 2019-10-01 PROCEDURE — S2900 ROBOTIC SURGICAL SYSTEM: CPT | Mod: NC

## 2019-10-01 PROCEDURE — 88360 TUMOR IMMUNOHISTOCHEM/MANUAL: CPT | Mod: 26

## 2019-10-01 RX ORDER — SODIUM CHLORIDE 9 MG/ML
1000 INJECTION, SOLUTION INTRAVENOUS
Refills: 0 | Status: DISCONTINUED | OUTPATIENT
Start: 2019-10-01 | End: 2019-10-20

## 2019-10-01 RX ORDER — HYDROMORPHONE HYDROCHLORIDE 2 MG/ML
0.5 INJECTION INTRAMUSCULAR; INTRAVENOUS; SUBCUTANEOUS
Refills: 0 | Status: DISCONTINUED | OUTPATIENT
Start: 2019-10-01 | End: 2019-10-01

## 2019-10-01 RX ORDER — ONDANSETRON 8 MG/1
4 TABLET, FILM COATED ORAL ONCE
Refills: 0 | Status: COMPLETED | OUTPATIENT
Start: 2019-10-01 | End: 2019-10-01

## 2019-10-01 RX ADMIN — ONDANSETRON 4 MILLIGRAM(S): 8 TABLET, FILM COATED ORAL at 19:37

## 2019-10-01 RX ADMIN — SODIUM CHLORIDE 125 MILLILITER(S): 9 INJECTION, SOLUTION INTRAVENOUS at 13:24

## 2019-10-01 NOTE — ASU DISCHARGE PLAN (ADULT/PEDIATRIC) - CARE PROVIDER_API CALL
Antoinette Terrazas (MD)  Gynecologic Oncology; Obstetrics and Gynecology  [address] [facility], [address] [place] [address]  Phone: [phone]  Fax: [phone]  Follow Up Time:

## 2019-10-01 NOTE — ASU PREOP CHECKLIST - SELECT TESTS ORDERED
CBC/BMP/Type and Cross/Type and Screen/POCT Blood Glucose/fs= Type and Screen/CBC/fs=91/BMP/POCT Blood Glucose/Type and Cross

## 2019-10-01 NOTE — BRIEF OPERATIVE NOTE - OPERATION/FINDINGS
14 week size uterus with multiple fibroids, 8cm posterior fibroid grossly normal ovaries and fallopian tubes, normal appendix and upper abdomen

## 2019-10-01 NOTE — ASU DISCHARGE PLAN (ADULT/PEDIATRIC) - ASU DC SPECIAL INSTRUCTIONSFT
Return to your regular way of eating.  Resume normal activity as tolerated, but no heavy lifting or strenuous activity for 6 weeks.  No driving for next 2 weeks and/or while on narcotic pain medication.  Complete vaginal rest, no tampons, no douching, no tub bathing, no sexual activities for 6 weeks unless otherwise instructed by your doctor.  Call your doctor with any signs and symptoms of infection such as fever, chills, nausea or vomiting.  Call your doctor with redness or swelling at the incision site, fluid leakage or wound separation.  Call your doctor if you're unable to tolerate food or have difficulty urinating.  Call your doctor if you have pain that is not relieved by your prescribed medications.  Notify your doctor with any other concerns.  Follow up with Dr. Terrazas on 10/14.

## 2019-10-01 NOTE — ASU DISCHARGE PLAN (ADULT/PEDIATRIC) - CALL YOUR DOCTOR IF YOU HAVE ANY OF THE FOLLOWING:
Wound/Surgical Site with redness, or foul smelling discharge or pus/Inability to tolerate liquids or foods/Bleeding that does not stop/Unable to urinate/Nausea and vomiting that does not stop/Fever greater than (need to indicate Fahrenheit or Celsius)

## 2019-10-01 NOTE — CHART NOTE - NSCHARTNOTEFT_GEN_A_CORE
Patient seen and examined at bedside, recently post-op. No acute complaints at this time. Denies CP, SOB, N/V, fevers, and chills.    Vital Signs Last 24 Hours  T(C): 36.4 (10-01-19 @ 14:45), Max: 37.1 (10-01-19 @ 06:17)  HR: 66 (10-01-19 @ 15:15) (35 - 77)  BP: 124/80 (10-01-19 @ 15:15) (87/71 - 146/92)  RR: 14 (10-01-19 @ 15:15) (4 - 18)  SpO2: 96% (10-01-19 @ 15:15) (94% - 100%)    I&O's Summary    01 Oct 2019 07:01  -  01 Oct 2019 15:47  --------------------------------------------------------  IN: 1975 mL / OUT: 1410 mL / NET: 565 mL        Physical Exam:  General: NAD  CV: NR, RR, S1, S2, no M/R/G  Lungs: CTA-B  Abdomen: Soft, appropriately tender, non-distended.  Incision: 4 port sites CDI, dressings intact.   Vaginal: Episiotomy hemostatic. Scant vaginal bleeding.  Ext: No pain or swelling    Labs:             11.8   7.53  )-----------( 185      ( 10-01 @ 14:41 )             36.6         MEDICATIONS  (STANDING):  lactated ringers. 1000 milliLiter(s) (125 mL/Hr) IV Continuous <Continuous>  ondansetron Injectable 4 milliGRAM(s) IV Push once  sodium chloride 0.9% lock flush 3 milliLiter(s) IV Push every 8 hours        MEDICATIONS  (PRN):  HYDROmorphone  Injectable 0.5 milliGRAM(s) IV Push every 10 minutes PRN Severe Pain (7 - 10)    A/P: 68yo POD0 s/p RA LSC TLH/BSO. Frozen = fibroid uterus. H/H stable. VSS. Clinically stable and doing well.    - d/c cohn  - ice for episiotomy site  - naproxen/percocet prn for pain control  - due to void and home    H Nate PGY2  Dr. Terrazas aware

## 2019-10-01 NOTE — BRIEF OPERATIVE NOTE - NSICDXBRIEFPROCEDURE_GEN_ALL_CORE_FT
PROCEDURES:  Robot-assisted laparoscopic total hysterectomy for uterus greater than 250 grams 01-Oct-2019 11:39:13  Antoinette Terrazas

## 2019-10-02 LAB — NON-GYNECOLOGICAL CYTOLOGY STUDY: SIGNIFICANT CHANGE UP

## 2019-10-10 LAB — SURGICAL PATHOLOGY STUDY: SIGNIFICANT CHANGE UP

## 2019-10-17 ENCOUNTER — APPOINTMENT (OUTPATIENT)
Dept: GYNECOLOGIC ONCOLOGY | Facility: CLINIC | Age: 69
End: 2019-10-17
Payer: MEDICARE

## 2019-10-17 VITALS
HEIGHT: 61 IN | HEART RATE: 84 BPM | DIASTOLIC BLOOD PRESSURE: 87 MMHG | BODY MASS INDEX: 24.83 KG/M2 | TEMPERATURE: 97.7 F | WEIGHT: 131.5 LBS | SYSTOLIC BLOOD PRESSURE: 147 MMHG

## 2019-10-17 DIAGNOSIS — Z71.9 COUNSELING, UNSPECIFIED: ICD-10-CM

## 2019-10-17 PROCEDURE — 99024 POSTOP FOLLOW-UP VISIT: CPT

## 2019-10-17 PROCEDURE — 99213 OFFICE O/P EST LOW 20 MIN: CPT | Mod: 24

## 2019-10-30 ENCOUNTER — OUTPATIENT (OUTPATIENT)
Dept: OUTPATIENT SERVICES | Facility: HOSPITAL | Age: 69
LOS: 1 days | End: 2019-10-30
Payer: MEDICARE

## 2019-10-30 VITALS
DIASTOLIC BLOOD PRESSURE: 76 MMHG | SYSTOLIC BLOOD PRESSURE: 124 MMHG | WEIGHT: 128.09 LBS | OXYGEN SATURATION: 97 % | HEIGHT: 59.5 IN | TEMPERATURE: 98 F | HEART RATE: 74 BPM | RESPIRATION RATE: 16 BRPM

## 2019-10-30 DIAGNOSIS — N60.12 DIFFUSE CYSTIC MASTOPATHY OF LEFT BREAST: Chronic | ICD-10-CM

## 2019-10-30 DIAGNOSIS — C80.1 MALIGNANT (PRIMARY) NEOPLASM, UNSPECIFIED: ICD-10-CM

## 2019-10-30 DIAGNOSIS — Z98.890 OTHER SPECIFIED POSTPROCEDURAL STATES: Chronic | ICD-10-CM

## 2019-10-30 DIAGNOSIS — Z90.710 ACQUIRED ABSENCE OF BOTH CERVIX AND UTERUS: Chronic | ICD-10-CM

## 2019-10-30 DIAGNOSIS — C54.1 MALIGNANT NEOPLASM OF ENDOMETRIUM: ICD-10-CM

## 2019-10-30 DIAGNOSIS — Z87.39 PERSONAL HISTORY OF OTHER DISEASES OF THE MUSCULOSKELETAL SYSTEM AND CONNECTIVE TISSUE: Chronic | ICD-10-CM

## 2019-10-30 DIAGNOSIS — Z01.818 ENCOUNTER FOR OTHER PREPROCEDURAL EXAMINATION: ICD-10-CM

## 2019-10-30 LAB
ALBUMIN SERPL ELPH-MCNC: 3.9 G/DL — SIGNIFICANT CHANGE UP (ref 3.3–5)
ALP SERPL-CCNC: 83 U/L — SIGNIFICANT CHANGE UP (ref 40–120)
ALT FLD-CCNC: 13 U/L — SIGNIFICANT CHANGE UP (ref 4–33)
ANION GAP SERPL CALC-SCNC: 15 MMO/L — HIGH (ref 7–14)
AST SERPL-CCNC: 25 U/L — SIGNIFICANT CHANGE UP (ref 4–32)
BILIRUB SERPL-MCNC: 0.3 MG/DL — SIGNIFICANT CHANGE UP (ref 0.2–1.2)
BLD GP AB SCN SERPL QL: NEGATIVE — SIGNIFICANT CHANGE UP
BUN SERPL-MCNC: 15 MG/DL — SIGNIFICANT CHANGE UP (ref 7–23)
CALCIUM SERPL-MCNC: 9.5 MG/DL — SIGNIFICANT CHANGE UP (ref 8.4–10.5)
CHLORIDE SERPL-SCNC: 102 MMOL/L — SIGNIFICANT CHANGE UP (ref 98–107)
CO2 SERPL-SCNC: 25 MMOL/L — SIGNIFICANT CHANGE UP (ref 22–31)
CREAT SERPL-MCNC: 0.68 MG/DL — SIGNIFICANT CHANGE UP (ref 0.5–1.3)
GLUCOSE SERPL-MCNC: 75 MG/DL — SIGNIFICANT CHANGE UP (ref 70–99)
POTASSIUM SERPL-MCNC: 4.6 MMOL/L — SIGNIFICANT CHANGE UP (ref 3.5–5.3)
POTASSIUM SERPL-SCNC: 4.6 MMOL/L — SIGNIFICANT CHANGE UP (ref 3.5–5.3)
PROT SERPL-MCNC: 8 G/DL — SIGNIFICANT CHANGE UP (ref 6–8.3)
RH IG SCN BLD-IMP: POSITIVE — SIGNIFICANT CHANGE UP
SODIUM SERPL-SCNC: 142 MMOL/L — SIGNIFICANT CHANGE UP (ref 135–145)

## 2019-10-30 PROCEDURE — 93010 ELECTROCARDIOGRAM REPORT: CPT

## 2019-10-30 RX ORDER — SODIUM CHLORIDE 9 MG/ML
1000 INJECTION, SOLUTION INTRAVENOUS
Refills: 0 | Status: DISCONTINUED | OUTPATIENT
Start: 2019-11-05 | End: 2019-11-22

## 2019-10-30 NOTE — H&P PST ADULT - NEGATIVE NEUROLOGICAL SYMPTOMS
no syncope/no focal seizures/no tremors/no paresthesias/no generalized seizures/no weakness/no transient paralysis

## 2019-10-30 NOTE — H&P PST ADULT - NSICDXPASTSURGICALHX_GEN_ALL_CORE_FT
PAST SURGICAL HISTORY:  Ductal papillomatosis of breast, left s/p excision 1980's    H/O breast surgery right breast calcifications removed, breast marker placed over 20 years ago    H/O surgical biopsy left breast 7/2018    History of scoliosis s/p surgical repair childhood PAST SURGICAL HISTORY:  Ductal papillomatosis of breast, left s/p excision 1980's    H/O breast surgery right breast calcifications removed, breast marker placed over 20 years ago    H/O surgical biopsy left breast 7/2018    History of scoliosis s/p surgical repair  x 2 childhood    S/P hysterectomy [facility]-BSO 10/01/19 PAST SURGICAL HISTORY:  Ductal papillomatosis of breast, left s/p excision 1980's    H/O breast surgery right breast calcifications removed, breast marker placed over 20 years ago    H/O surgical biopsy left breast 7/2018    History of scoliosis s/p surgical repair  x 2 childhood    S/P hysterectomy [facility]-BSO 10/01/19 PAST SURGICAL HISTORY:  Ductal papillomatosis of breast, left s/p excision 1980's    H/O breast surgery right breast calcifications removed, breast marker placed over 20 years ago    H/O surgical biopsy left breast 7/2018    History of scoliosis s/p surgical repair  x 2 childhood    S/P hysterectomy [facility]-BSO 10/01/19

## 2019-10-30 NOTE — H&P PST ADULT - HISTORY OF PRESENT ILLNESS
70 y/o female s/p recent GYN visit.  Polyp discovered on cervix, cancer cells discovered on biopsy per pt.  Scheduled for Robotic Assisted Total Laparoscopic hysterectomy BSO, possible staging 10/1/19. 70 y/o female s/p recent GYN visit.  Polyp discovered on cervix, cancer cells discovered on biopsy per pt.  Scheduled for Robotic Assisted Total Laparoscopic hysterectomy BSO, possible staging 10/1/19. Pt reports f/u with surgeon; pt now presents for Robotic Pelvic  and  Para Aortic Lymph Node Dissection

## 2019-10-30 NOTE — H&P PST ADULT - NEGATIVE GENERAL SYMPTOMS
no fatigue/no chills/no polyphagia/no polyuria/no polydipsia/no anorexia/no weight loss/no fever/no malaise

## 2019-10-30 NOTE — H&P PST ADULT - VISION (WITH CORRECTIVE LENSES IF THE PATIENT USUALLY WEARS THEM):
Normal vision: sees adequately in most situations; can see medication labels, newsprint corrective lenses

## 2019-10-30 NOTE — H&P PST ADULT - NSICDXPROBLEM_GEN_ALL_CORE_FT
PROBLEM DIAGNOSES  Problem: Malignant neoplasm  Assessment and Plan: Robotic Pelvic and Para Aortic Lymph Node Dissection  Pre op instructions including Hibiclens with teach back reviewed with pt ; pt verbalized good understanding of pre op instructions  request last medical note DR winston

## 2019-10-30 NOTE — H&P PST ADULT - NEGATIVE CARDIOVASCULAR SYMPTOMS
no orthopnea/no dyspnea on exertion/no claudication/no palpitations/no chest pain/no paroxysmal nocturnal dyspnea/no peripheral edema

## 2019-10-30 NOTE — H&P PST ADULT - NSICDXPASTMEDICALHX_GEN_ALL_CORE_FT
PAST MEDICAL HISTORY:  Benign neoplasm breast, left     Breast calcification seen on mammogram right breast    Essential hypertension     Hyperlipidemia, unspecified hyperlipidemia type     Sciatica of left side     Scoliosis, unspecified scoliosis type, unspecified spinal region PAST MEDICAL HISTORY:  Benign neoplasm breast, left     Breast calcification seen on mammogram right breast    Essential hypertension     Fibroids     Hyperlipidemia, unspecified hyperlipidemia type     Sciatica of left side     Scoliosis, unspecified scoliosis type, unspecified spinal region PAST MEDICAL HISTORY:  Benign neoplasm breast, left     Breast calcification seen on mammogram right breast    Essential hypertension     Fibroids     Hyperlipidemia, unspecified hyperlipidemia type     Neck pain pPt states last studies 2017    Sciatica of left side     Scoliosis, unspecified scoliosis type, unspecified spinal region PAST MEDICAL HISTORY:  Benign neoplasm breast, left     Breast calcification seen on mammogram right breast    Essential hypertension     Fibroids     Hyperlipidemia, unspecified hyperlipidemia type     Neck pain Pt states last studies 2017    Sciatica of left side     Scoliosis, unspecified scoliosis type, unspecified spinal region

## 2019-11-04 ENCOUNTER — TRANSCRIPTION ENCOUNTER (OUTPATIENT)
Age: 69
End: 2019-11-04

## 2019-11-05 ENCOUNTER — APPOINTMENT (OUTPATIENT)
Dept: GYNECOLOGIC ONCOLOGY | Facility: HOSPITAL | Age: 69
End: 2019-11-05

## 2019-11-05 ENCOUNTER — OUTPATIENT (OUTPATIENT)
Dept: OUTPATIENT SERVICES | Facility: HOSPITAL | Age: 69
LOS: 1 days | Discharge: HOME | End: 2019-11-05
Payer: MEDICARE

## 2019-11-05 ENCOUNTER — RESULT REVIEW (OUTPATIENT)
Age: 69
End: 2019-11-05

## 2019-11-05 VITALS — TEMPERATURE: 97 F

## 2019-11-05 VITALS
DIASTOLIC BLOOD PRESSURE: 74 MMHG | OXYGEN SATURATION: 99 % | HEIGHT: 59.5 IN | RESPIRATION RATE: 14 BRPM | SYSTOLIC BLOOD PRESSURE: 116 MMHG | HEART RATE: 93 BPM | TEMPERATURE: 98 F | WEIGHT: 128.09 LBS

## 2019-11-05 DIAGNOSIS — Z98.890 OTHER SPECIFIED POSTPROCEDURAL STATES: Chronic | ICD-10-CM

## 2019-11-05 DIAGNOSIS — Z87.39 PERSONAL HISTORY OF OTHER DISEASES OF THE MUSCULOSKELETAL SYSTEM AND CONNECTIVE TISSUE: Chronic | ICD-10-CM

## 2019-11-05 DIAGNOSIS — N60.12 DIFFUSE CYSTIC MASTOPATHY OF LEFT BREAST: Chronic | ICD-10-CM

## 2019-11-05 DIAGNOSIS — C54.1 MALIGNANT NEOPLASM OF ENDOMETRIUM: ICD-10-CM

## 2019-11-05 DIAGNOSIS — Z90.710 ACQUIRED ABSENCE OF BOTH CERVIX AND UTERUS: Chronic | ICD-10-CM

## 2019-11-05 PROCEDURE — S2900 ROBOTIC SURGICAL SYSTEM: CPT | Mod: NC

## 2019-11-05 PROCEDURE — 88307 TISSUE EXAM BY PATHOLOGIST: CPT | Mod: 26

## 2019-11-05 PROCEDURE — 38571 LAPAROSCOPY LYMPHADENECTOMY: CPT | Mod: 22,58,GC

## 2019-11-05 RX ORDER — FENTANYL CITRATE 50 UG/ML
50 INJECTION INTRAVENOUS
Refills: 0 | Status: DISCONTINUED | OUTPATIENT
Start: 2019-11-05 | End: 2019-11-05

## 2019-11-05 RX ORDER — ONDANSETRON 8 MG/1
4 TABLET, FILM COATED ORAL ONCE
Refills: 0 | Status: DISCONTINUED | OUTPATIENT
Start: 2019-11-05 | End: 2019-11-22

## 2019-11-05 RX ADMIN — SODIUM CHLORIDE 30 MILLILITER(S): 9 INJECTION, SOLUTION INTRAVENOUS at 11:22

## 2019-11-05 NOTE — CHART NOTE - NSCHARTNOTEFT_GEN_A_CORE
PA POST OP NOTE:       SUBJECTIVE:  Patient seen and examined in recliner. Patient is awake and resting comfortably. Reports pain is under good control at this time. Denies c/o nausea, vomiting, sob, cp or palpitations. Chelsi sips of tea and crackers. Pt still DTV.    Vital Signs Last 24 Hrs  T(C): 36.4 (05 Nov 2019 12:00), Max: 36.6 (05 Nov 2019 07:01)  T(F): 97.5 (05 Nov 2019 12:00), Max: 97.8 (05 Nov 2019 07:01)  HR: 74 (05 Nov 2019 12:30) (74 - 93)  BP: 129/75 (05 Nov 2019 12:15) (110/72 - 129/75)  BP(mean): 88 (05 Nov 2019 12:15) (87 - 91)  RR: 17 (05 Nov 2019 12:30) (14 - 20)  SpO2: 94% (05 Nov 2019 12:30) (94% - 100%)      PHYSICAL EXAM:    LUNGS: Clear to auscultation bilaterally; No wheezing.  HEART: Regular, rate and rhythm; No murmurs.  ABDOMEN: Soft, + BS, nondistended and appropriately tender on palpation.  INCISION:  Scope sites intact. Dressings clean and dry.   EXTREMITIES: No swelling or calf tenderness bilaterally.   ELIAS: Removed in OR.    MEDICATIONS  (STANDING):  lactated ringers. 1000 milliLiter(s) (125 mL/Hr) IV Continuous <Continuous>    MEDICATIONS  (PRN):  fentaNYL    Injectable 50 MICROGram(s) IV Push every 10 minutes PRN Severe Pain (7 - 10)  ondansetron Injectable 4 milliGRAM(s) IV Push once PRN Nausea and/or Vomiting      ASSESMENT/PLAN: 68y/o POD#0  from Robotic PPALND and SMOOTH in stable condition.    1. Regular diet as tolerate.  2. IVF: RL @125cc/hr.  3. Activity: Out of bed with assist.  4. Vital Signs Q routine.  5. Pulm: pulse Ox monitoring and encourage incentive spirometer use.  6. Pain meds as ordered.  7. DTV  Evaluate for discharge home when tolerates diet, voids, ambulates and vss.

## 2019-11-05 NOTE — ASU DISCHARGE PLAN (ADULT/PEDIATRIC) - ASU DC SPECIAL INSTRUCTIONSFT
nothing in vagina (sex, tampons, douching) no heavy lifting (>10 lbs) for 6 weeks. Please return to ER or call your doctors office if pain is worsening, you are unable to keep down food or drink, fever >100.4, heavy vaginal bleeding. You are able to take a shower regularly. nothing in vagina (sex, tampons, douching) no heavy lifting (>10 lbs) for 6 weeks. Please return to ER or call your doctors office if pain is worsening, you are unable to keep down food or drink, fever >100.4, heavy vaginal bleeding. You are able to take a shower regularly.    For comfort, for the next 1-2 days, ice packs to abdomen 20 minutes on and 20 minutes 0ff KEEP BANDAGES DRY nothing in vagina (sex, tampons, douching) no heavy lifting (>10 lbs) for 6 weeks. Please return to ER or call your doctors office if pain is worsening, you are unable to keep down food or drink, fever >100.4, heavy vaginal bleeding. You are able to take a shower regularly.    For comfort, for the next 1-2 days, ice packs to abdomen 20 minutes on and 20 minutes 0ff KEEP BANDAGES DRY    No TYLENOL OR TYLENOL PRODUCTS BEFORE 4 pm

## 2019-11-05 NOTE — BRIEF OPERATIVE NOTE - NSICDXBRIEFPROCEDURE_GEN_ALL_CORE_FT
PROCEDURES:  Lymphadenectomy, pelvic, robot-assisted, laparoscopic 05-Nov-2019 10:07:39  Antoinette Terrazas

## 2019-11-05 NOTE — BRIEF OPERATIVE NOTE - OPERATION/FINDINGS
uterus and adnexa surgically absent  extensive adhesions of small bowel to vaginal cuff and right pelvic sidewall  minimally enlarged obturator lymph nodes

## 2019-11-05 NOTE — ASU DISCHARGE PLAN (ADULT/PEDIATRIC) - CARE PROVIDER_API CALL
Antoinette Terrazas (MD)  Gynecologic Oncology; Obstetrics and Gynecology  [address] [facility], [address] [place] [address]  Phone: [phone]  Fax: [phone]  Follow Up Time:

## 2019-11-05 NOTE — ASU DISCHARGE PLAN (ADULT/PEDIATRIC) - CALL YOUR DOCTOR IF YOU HAVE ANY OF THE FOLLOWING:
Unable to urinate/Fever greater than (need to indicate Fahrenheit or Celsius)/Inability to tolerate liquids or foods/Numbness, tingling, color or temperature change to extremity/Pain not relieved by Medications/Bleeding that does not stop/Nausea and vomiting that does not stop Numbness, tingling, color or temperature change to extremity/Bleeding that does not stop/Nausea and vomiting that does not stop/Unable to urinate/Inability to tolerate liquids or foods/Pain not relieved by Medications/Fever greater than (need to indicate Fahrenheit or Celsius)/Swelling that gets worse

## 2019-11-11 LAB — SURGICAL PATHOLOGY STUDY: SIGNIFICANT CHANGE UP

## 2019-11-21 ENCOUNTER — APPOINTMENT (OUTPATIENT)
Dept: GYNECOLOGIC ONCOLOGY | Facility: CLINIC | Age: 69
End: 2019-11-21
Payer: MEDICARE

## 2019-11-21 VITALS
TEMPERATURE: 97.5 F | BODY MASS INDEX: 24.99 KG/M2 | HEART RATE: 79 BPM | SYSTOLIC BLOOD PRESSURE: 149 MMHG | DIASTOLIC BLOOD PRESSURE: 85 MMHG | WEIGHT: 132.38 LBS | HEIGHT: 61 IN

## 2019-11-21 PROBLEM — D21.9 BENIGN NEOPLASM OF CONNECTIVE AND OTHER SOFT TISSUE, UNSPECIFIED: Chronic | Status: ACTIVE | Noted: 2019-10-30

## 2019-11-21 PROBLEM — M54.2 CERVICALGIA: Chronic | Status: ACTIVE | Noted: 2019-10-30

## 2019-11-21 PROCEDURE — 99024 POSTOP FOLLOW-UP VISIT: CPT

## 2019-12-02 ENCOUNTER — OUTPATIENT (OUTPATIENT)
Dept: OUTPATIENT SERVICES | Facility: HOSPITAL | Age: 69
LOS: 1 days | Discharge: HOME | End: 2019-12-02

## 2019-12-02 DIAGNOSIS — Z90.710 ACQUIRED ABSENCE OF BOTH CERVIX AND UTERUS: Chronic | ICD-10-CM

## 2019-12-02 DIAGNOSIS — Z87.39 PERSONAL HISTORY OF OTHER DISEASES OF THE MUSCULOSKELETAL SYSTEM AND CONNECTIVE TISSUE: Chronic | ICD-10-CM

## 2019-12-02 DIAGNOSIS — Z98.890 OTHER SPECIFIED POSTPROCEDURAL STATES: Chronic | ICD-10-CM

## 2019-12-02 DIAGNOSIS — N60.12 DIFFUSE CYSTIC MASTOPATHY OF LEFT BREAST: Chronic | ICD-10-CM

## 2019-12-02 DIAGNOSIS — C54.1 MALIGNANT NEOPLASM OF ENDOMETRIUM: ICD-10-CM

## 2019-12-03 ENCOUNTER — OUTPATIENT (OUTPATIENT)
Dept: OUTPATIENT SERVICES | Facility: HOSPITAL | Age: 69
LOS: 1 days | Discharge: HOME | End: 2019-12-03
Payer: MEDICARE

## 2019-12-03 DIAGNOSIS — N60.12 DIFFUSE CYSTIC MASTOPATHY OF LEFT BREAST: Chronic | ICD-10-CM

## 2019-12-03 DIAGNOSIS — Z87.39 PERSONAL HISTORY OF OTHER DISEASES OF THE MUSCULOSKELETAL SYSTEM AND CONNECTIVE TISSUE: Chronic | ICD-10-CM

## 2019-12-03 DIAGNOSIS — Z98.890 OTHER SPECIFIED POSTPROCEDURAL STATES: Chronic | ICD-10-CM

## 2019-12-03 DIAGNOSIS — Z90.710 ACQUIRED ABSENCE OF BOTH CERVIX AND UTERUS: Chronic | ICD-10-CM

## 2019-12-04 ENCOUNTER — APPOINTMENT (OUTPATIENT)
Dept: RADIATION ONCOLOGY | Facility: CLINIC | Age: 69
End: 2019-12-04
Payer: MEDICARE

## 2019-12-04 ENCOUNTER — RESULT REVIEW (OUTPATIENT)
Age: 69
End: 2019-12-04

## 2019-12-04 ENCOUNTER — APPOINTMENT (OUTPATIENT)
Dept: HEMATOLOGY ONCOLOGY | Facility: CLINIC | Age: 69
End: 2019-12-04
Payer: MEDICARE

## 2019-12-04 VITALS
RESPIRATION RATE: 16 BRPM | BODY MASS INDEX: 24.89 KG/M2 | OXYGEN SATURATION: 100 % | HEIGHT: 61 IN | TEMPERATURE: 99.4 F | SYSTOLIC BLOOD PRESSURE: 128 MMHG | HEART RATE: 83 BPM | DIASTOLIC BLOOD PRESSURE: 86 MMHG | WEIGHT: 131.83 LBS

## 2019-12-04 VITALS
SYSTOLIC BLOOD PRESSURE: 143 MMHG | WEIGHT: 132.28 LBS | HEART RATE: 109 BPM | DIASTOLIC BLOOD PRESSURE: 88 MMHG | OXYGEN SATURATION: 98 % | TEMPERATURE: 98.6 F | HEIGHT: 59.45 IN | BODY MASS INDEX: 26.31 KG/M2 | RESPIRATION RATE: 18 BRPM

## 2019-12-04 DIAGNOSIS — Z80.6 FAMILY HISTORY OF LEUKEMIA: ICD-10-CM

## 2019-12-04 DIAGNOSIS — Z87.39 PERSONAL HISTORY OF OTHER DISEASES OF THE MUSCULOSKELETAL SYSTEM AND CONNECTIVE TISSUE: ICD-10-CM

## 2019-12-04 DIAGNOSIS — Z86.69 PERSONAL HISTORY OF OTHER DISEASES OF THE NERVOUS SYSTEM AND SENSE ORGANS: ICD-10-CM

## 2019-12-04 PROCEDURE — 99204 OFFICE O/P NEW MOD 45 MIN: CPT | Mod: GC,25

## 2019-12-04 PROCEDURE — 99205 OFFICE O/P NEW HI 60 MIN: CPT

## 2019-12-04 RX ORDER — AMOXICILLIN AND CLAVULANATE POTASSIUM 875; 125 MG/1; MG/1
875-125 TABLET, COATED ORAL TWICE DAILY
Qty: 28 | Refills: 0 | Status: DISCONTINUED | COMMUNITY
Start: 2019-10-17 | End: 2019-12-04

## 2019-12-04 NOTE — REASON FOR VISIT
[Consideration of Curative Therapy] : consideration of curative therapy for [Family Member] : family member [Endometrial Cancer] : endometrial cancer

## 2019-12-04 NOTE — OB HISTORY
[Total Preg ___] : : [unfilled] [AB Spont ___] : [unfilled] miscarriage(s) [Vaginal ___] : [unfilled] vaginal delivery(s) [Definite ___ (Date)] : the last menstrual period was [unfilled] [Menarche Age ____] : age at menarche was [unfilled] [Menopause  Age ____] : menopause occurred at age [unfilled]

## 2019-12-04 NOTE — PHYSICAL EXAM
[] : no respiratory distress [Exaggerated Use Of Accessory Muscles For Inspiration] : no accessory muscle use [Heart Rate And Rhythm] : heart rate and rhythm were normal [Abdomen Soft] : soft [Nondistended] : nondistended [Abdomen Tenderness] : non-tender [Normal External Genitalia] : normal external genitalia  [Normal] : no palpable adenopathy [Skin Color & Pigmentation] : normal skin color and pigmentation [Oriented To Time, Place, And Person] : oriented to person, place, and time [Normal Vaginal Cuff] : vaginal cuff without lesion or nodularity

## 2019-12-04 NOTE — VITALS
[Maximal Pain Intensity: 3/10] : 3/10 [Least Pain Intensity: 0/10] : 0/10 [Pain Duration: ___] : Pain duration: [unfilled] [90: Able to carry normal activity; minor signs or symptoms of disease.] : 90: Able to carry normal activity; minor signs or symptoms of disease.  [OTC] : OTC [ECOG Performance Status: 1 - Restricted in physically strenuous activity but ambulatory and able to carry out work of a light or sedentary nature] : Performance Status: 1 - Restricted in physically strenuous activity but ambulatory and able to carry out work of a light or sedentary nature, e.g., light house work, office work [Date: ____________] : Patient's last distress assessment performed on [unfilled]. [0 - No Distress] : Distress Level: 0

## 2019-12-05 NOTE — ASSESSMENT
[FreeTextEntry1] : 70 y/o F with recently diagnosed stage IIIC [id number] grade 1 (well differentiated) endometrial carcinoma involving b/l pelvic nodes, s/p PITO/BSO and pelvic LN dissection, presenting to medical oncology for consideration of adjuvant therapy options. \par \par We reviewed in detail, the patient's workup to date and diagnosis of endometrial cancer, including findings from radiographic studies, surgical staging and pathology with the patient and her daughter. Given that the patient was found to have stage IIIC disease involving b/l pelvic lymph nodes, we discussed my recommendations for adjuvant chemotherapy. In discussion with Dr. Wright of radiation oncology, she would be a good candidate for the sandwich strategy with both chemotherapy and radiation therapy. \par \par Potential benefits and risks, as well as possible toxicities and side effects of chemotherapy with carboplatin and paclitaxel were discussed in detail with the patient. These include but are not limited to: fatigue, nausea, vomiting, hair loss, diarrhea, constipation, neuropathy, cytopenias with increased risk of infections, renal dysfunction and allergic reactions. We also briefly discussed ways to manage these potential symptoms. \par \par The patient is in agreement with the plan above and we will proceed with 3 cycles of carbo/taxol followed by RT to the pelvic LNs w/ radiation oncology. Chemotherapy consent was signed.\par \par Plan:\par -IR referral for mediport placement\par -3 cycles carbo/taxol planned prior to RT\par -f/u with stereotactic biopsy of calcifications seen on recent mammogram at [facility] (8/2019)\par \par All of the patient and her daughter's questions and concerns were addressed in full.  [Curative] : Goals of care discussed with patient: Curative

## 2019-12-05 NOTE — HISTORY OF PRESENT ILLNESS
[Disease: _____________________] : Disease: [unfilled] [N: ___] : N[unfilled] [T: ___] : T[unfilled] [AJCC Stage: ____] : AJCC Stage: [unfilled] [M: ___] : M[unfilled] [de-identified] : Referred by Dr. Sylvia Terrazas\par \par Ms. Barakat is a 68 y/o post-menopausal F who saw Dr. Andrade for an annual gyn exam in 2019. She reports feeling well without any symptoms, including vaginal discharge or bleeding at that time. TVUS during this visit revealed a thickened endometrium and underwent D&C hysteroscopy with polypectomy on 19. Pathology (reviewed at [facility]) showed fragments of markedly atypical glandular epithelium, highly suspicious for adenocarcinoma. \par \par CT A/P demonstrated a large endometrial mass, probably posterior fibroid and R ovarian cyst. No evidence of metastatic disease was seen.\par \par She was seen by Dr. Terrazas and underwent PITO/BSO on 10/1/19. Surgical pathology revealed well differentiated adenocarcinoma arising in the lower uterine segment, favor entometrioid, grade 1, involving the cervical stroma and 99% myometrium. +LVI, parametrial invasion+ and no lymph nodes were submitted. It was noted that the tumor was multi-focally deeply myoinvasive within the lower uterine segment; appears to undermine endometrium. \par \par Patient subsequently underwent surgical staging on 19 with b/l pelvis LND and pathology revealed 1/6 nodes on the L and 1/2 nodes on the R to be positive for metastatic carcinoma. No perinodal extension was present. \par \par She has recovered from her recent surgeries without any issues and feels well overall today. She was referred to medical oncology and radiation oncology for adjuvant therapy options. She denies fevers, chills, abdominal pain, vaginal bleeding/discharge, CP, SOB, weight loss, urinary symptoms, edema or any other new symptoms. 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  \par Breast biopsy in 2018 with intraductal papilloma, excision of left breast mass with NL in 10/2018 pathology with fibrocystic changes with intraductal papillary hyperplasia.  \par  [de-identified] : adenocarcinoma

## 2019-12-05 NOTE — PHYSICAL EXAM
[Fully active, able to carry on all pre-disease performance without restriction] : Status 0 - Fully active, able to carry on all pre-disease performance without restriction [Normal] : no adenopathy noted in supraclavicular lymph nodes

## 2019-12-06 RX ORDER — APREPITANT 80 MG/1
80 CAPSULE ORAL
Qty: 1 | Refills: 4 | Status: ACTIVE | COMMUNITY
Start: 2019-12-06 | End: 1900-01-01

## 2019-12-06 RX ORDER — ONDANSETRON 8 MG/1
8 TABLET ORAL EVERY 8 HOURS
Qty: 30 | Refills: 2 | Status: ACTIVE | COMMUNITY
Start: 2019-12-06 | End: 1900-01-01

## 2019-12-06 RX ORDER — PROCHLORPERAZINE MALEATE 10 MG/1
10 TABLET ORAL EVERY 6 HOURS
Qty: 30 | Refills: 2 | Status: ACTIVE | COMMUNITY
Start: 2019-12-06 | End: 1900-01-01

## 2019-12-09 ENCOUNTER — OUTPATIENT (OUTPATIENT)
Dept: OUTPATIENT SERVICES | Facility: HOSPITAL | Age: 69
LOS: 1 days | End: 2019-12-09
Payer: MEDICARE

## 2019-12-09 DIAGNOSIS — Z90.710 ACQUIRED ABSENCE OF BOTH CERVIX AND UTERUS: Chronic | ICD-10-CM

## 2019-12-09 DIAGNOSIS — N60.12 DIFFUSE CYSTIC MASTOPATHY OF LEFT BREAST: Chronic | ICD-10-CM

## 2019-12-09 DIAGNOSIS — Z98.890 OTHER SPECIFIED POSTPROCEDURAL STATES: Chronic | ICD-10-CM

## 2019-12-09 DIAGNOSIS — Z87.39 PERSONAL HISTORY OF OTHER DISEASES OF THE MUSCULOSKELETAL SYSTEM AND CONNECTIVE TISSUE: Chronic | ICD-10-CM

## 2019-12-09 DIAGNOSIS — C54.1 MALIGNANT NEOPLASM OF ENDOMETRIUM: ICD-10-CM

## 2019-12-09 PROCEDURE — C1788: CPT

## 2019-12-09 PROCEDURE — C1894: CPT

## 2019-12-09 PROCEDURE — 77001 FLUOROGUIDE FOR VEIN DEVICE: CPT

## 2019-12-09 PROCEDURE — 36561 INSERT TUNNELED CV CATH: CPT

## 2019-12-09 PROCEDURE — 77001 FLUOROGUIDE FOR VEIN DEVICE: CPT | Mod: 26

## 2019-12-09 PROCEDURE — 76937 US GUIDE VASCULAR ACCESS: CPT | Mod: 26

## 2019-12-09 PROCEDURE — C1769: CPT

## 2019-12-09 PROCEDURE — 76937 US GUIDE VASCULAR ACCESS: CPT

## 2019-12-10 NOTE — HISTORY OF PRESENT ILLNESS
[FreeTextEntry1] : Ms. Barakat is a 70yo female presenting for evaluation of endometrial cancer. \par \par Her oncologic history is as follows.  \par \par 9/2019 she noted vaginal discharge and annual OBGYN exam with Dr. Andrade showed thickened endometrium on TVUS.  \par \par She underwent D&C hysteroscopy with polypectomy on 9/16/19. Pathology which was reviewed at [facility] revealed minute fragments of markedly atypical glandular epithelium, highly suspicious for adenocarcinoma.  \par \par CT A/P revealed large endometrial mass, probable posterior fibroid, and probable right ovarian cyst. No evidence of metastatic disease. \par \par 10/1/19: TAHBSO revealed well differentiated adenocarcinoma arising in lower uterine segment involving cervical stroma and 99% of myometrium, LVI present, parametrial invasion positive, no lymph nodes submitted \par \par 11/5/19: Bilateral Pelvic LND revealed 1/6 nodes on the left and ½ nodes on the right were positive for metastatic carcinoma, 8mm in greatest dimension, no perinodal extension present ]\par \par Today, she reports feeling generally well. Recovery well post surgery, c/o mild gas discomfort. . Denies pelvic pain. She is ordered for right breast biopsy for calcifications noted on Mammo done in August. She has a history of benign left breast mild duct lesion.

## 2019-12-12 RX ORDER — DEXAMETHASONE 4 MG/1
4 TABLET ORAL
Qty: 10 | Refills: 0 | Status: ACTIVE | COMMUNITY
Start: 2019-12-12 | End: 1900-01-01

## 2019-12-13 DIAGNOSIS — C57.9 MALIGNANT NEOPLASM OF FEMALE GENITAL ORGAN, UNSPECIFIED: ICD-10-CM

## 2019-12-13 DIAGNOSIS — Z45.2 ENCOUNTER FOR ADJUSTMENT AND MANAGEMENT OF VASCULAR ACCESS DEVICE: ICD-10-CM

## 2019-12-18 ENCOUNTER — RESULT REVIEW (OUTPATIENT)
Age: 69
End: 2019-12-18

## 2019-12-18 ENCOUNTER — APPOINTMENT (OUTPATIENT)
Dept: INFUSION THERAPY | Facility: HOSPITAL | Age: 69
End: 2019-12-18

## 2019-12-19 DIAGNOSIS — R11.2 NAUSEA WITH VOMITING, UNSPECIFIED: ICD-10-CM

## 2019-12-19 DIAGNOSIS — Z51.11 ENCOUNTER FOR ANTINEOPLASTIC CHEMOTHERAPY: ICD-10-CM

## 2019-12-30 ENCOUNTER — RESULT REVIEW (OUTPATIENT)
Age: 69
End: 2019-12-30

## 2019-12-30 ENCOUNTER — APPOINTMENT (OUTPATIENT)
Dept: HEMATOLOGY ONCOLOGY | Facility: CLINIC | Age: 69
End: 2019-12-30
Payer: MEDICARE

## 2019-12-30 ENCOUNTER — APPOINTMENT (OUTPATIENT)
Dept: INFUSION THERAPY | Facility: HOSPITAL | Age: 69
End: 2019-12-30

## 2019-12-30 ENCOUNTER — LABORATORY RESULT (OUTPATIENT)
Age: 69
End: 2019-12-30

## 2019-12-30 VITALS
SYSTOLIC BLOOD PRESSURE: 138 MMHG | RESPIRATION RATE: 16 BRPM | BODY MASS INDEX: 25.57 KG/M2 | HEART RATE: 77 BPM | TEMPERATURE: 98.2 F | WEIGHT: 128.53 LBS | OXYGEN SATURATION: 100 % | DIASTOLIC BLOOD PRESSURE: 89 MMHG

## 2019-12-30 PROCEDURE — 99214 OFFICE O/P EST MOD 30 MIN: CPT

## 2019-12-30 NOTE — OB HISTORY
[Total Preg ___] : : [unfilled] [Vaginal ___] : [unfilled] vaginal delivery(s) [AB Spont ___] : [unfilled] miscarriage(s) [Definite ___ (Date)] : the last menstrual period was [unfilled] [Menarche Age ____] : age at menarche was [unfilled] [Menopause  Age ____] : menopause occurred at age [unfilled]

## 2019-12-30 NOTE — HISTORY OF PRESENT ILLNESS
[Disease: _____________________] : Disease: [unfilled] [T: ___] : T[unfilled] [N: ___] : N[unfilled] [M: ___] : M[unfilled] [AJCC Stage: ____] : AJCC Stage: [unfilled] [FreeTextEntry1] : Carbo/taxol  [de-identified] : Ms. Barakat is a 70 y/o post-menopausal F who saw Dr. Andrade for an annual gyn exam in 2019. She reports feeling well without any symptoms, including vaginal discharge or bleeding at that time. TVUS during this visit revealed a thickened endometrium and underwent D&C hysteroscopy with polypectomy on 19. Pathology (reviewed at [facility]) showed fragments of markedly atypical glandular epithelium, highly suspicious for adenocarcinoma. \par \par CT A/P demonstrated a large endometrial mass, probably posterior fibroid and R ovarian cyst. No evidence of metastatic disease was seen.\par \par She was seen by Dr. Terrazas and underwent PITO/BSO on 10/1/19. Surgical pathology revealed well differentiated adenocarcinoma arising in the lower uterine segment, favor entometrioid, grade 1, involving the cervical stroma and 99% myometrium. +LVI, parametrial invasion+ and no lymph nodes were submitted. It was noted that the tumor was multi-focally deeply myoinvasive within the lower uterine segment; appears to undermine endometrium. \par \par Patient subsequently underwent surgical staging on 19 with b/l pelvis LND and pathology revealed 1/6 nodes on the L and 1/2 nodes on the R to be positive for metastatic carcinoma. No perinodal extension was present. \par \par She has recovered from her recent surgeries without any issues and feels well overall today. She was referred to medical oncology and radiation oncology for adjuvant therapy options. She denies fevers, chills, abdominal pain, vaginal bleeding/discharge, CP, SOB, weight loss, urinary symptoms, edema or any other new symptoms. 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  \par Breast biopsy in 2018 with intraductal papilloma, excision of left breast mass with NL in 10/2018 pathology with fibrocystic changes with intraductal papillary hyperplasia.  \par  [de-identified] : adenocarcinoma  [de-identified] : Patient presents with her daughter today following C1 carbo/taxol. She feels well with excellent appetite and energy. She has been able to maintain all of her daily activities. Denies n/v, f/c, abdominal pain, urinary symptoms, changes in bowel habits or vaginal bleeding/discharge.

## 2019-12-30 NOTE — PHYSICAL EXAM
[Fully active, able to carry on all pre-disease performance without restriction] : Status 0 - Fully active, able to carry on all pre-disease performance without restriction [Normal] : No focal neurologic defects observed

## 2019-12-30 NOTE — ASSESSMENT
[FreeTextEntry1] : 68 y/o F with recently diagnosed stage IIIC [id number] grade 1 (well differentiated) endometrial carcinoma involving b/l pelvic nodes, s/p PITO/BSO and pelvic LN dissection, currently undergoing adjuvant carbo/taxol chemotherapy. \par In discussion with Dr. Wright of radiation oncology, plan is for "sandwich" therapy strategy with chemotherapy and radiation therapy. \par \par Patient is s/p C1 carbo/taxol and tolerating well without significant toxicities. \par \par -bloodwork today and proceed with C2 carbo/taxol\par -plan for RT to pelvic LN after 3 cycles carbo/taxol\par \par All of the patient and her daughter's questions and concerns were addressed in full. \par \par  [Curative] : Goals of care discussed with patient: Curative

## 2020-01-02 ENCOUNTER — OUTPATIENT (OUTPATIENT)
Dept: OUTPATIENT SERVICES | Facility: HOSPITAL | Age: 70
LOS: 1 days | Discharge: HOME | End: 2020-01-02

## 2020-01-02 DIAGNOSIS — Z87.39 PERSONAL HISTORY OF OTHER DISEASES OF THE MUSCULOSKELETAL SYSTEM AND CONNECTIVE TISSUE: Chronic | ICD-10-CM

## 2020-01-02 DIAGNOSIS — Z98.890 OTHER SPECIFIED POSTPROCEDURAL STATES: Chronic | ICD-10-CM

## 2020-01-02 DIAGNOSIS — C54.1 MALIGNANT NEOPLASM OF ENDOMETRIUM: ICD-10-CM

## 2020-01-02 DIAGNOSIS — Z90.710 ACQUIRED ABSENCE OF BOTH CERVIX AND UTERUS: Chronic | ICD-10-CM

## 2020-01-02 DIAGNOSIS — N60.12 DIFFUSE CYSTIC MASTOPATHY OF LEFT BREAST: Chronic | ICD-10-CM

## 2020-01-03 LAB
ALBUMIN SERPL ELPH-MCNC: 4.2 G/DL
ALBUMIN SERPL ELPH-MCNC: 4.5 G/DL
ALP BLD-CCNC: 104 U/L
ALP BLD-CCNC: 107 U/L
ALT SERPL-CCNC: 16 U/L
ALT SERPL-CCNC: 9 U/L
ANION GAP SERPL CALC-SCNC: 10 MMOL/L
ANION GAP SERPL CALC-SCNC: 17 MMOL/L
AST SERPL-CCNC: 20 U/L
AST SERPL-CCNC: 21 U/L
BILIRUB SERPL-MCNC: 0.3 MG/DL
BILIRUB SERPL-MCNC: 0.4 MG/DL
BUN SERPL-MCNC: 15 MG/DL
BUN SERPL-MCNC: 15 MG/DL
CALCIUM SERPL-MCNC: 10.2 MG/DL
CALCIUM SERPL-MCNC: 9.5 MG/DL
CANCER AG125 SERPL-ACNC: 20 U/ML
CEA SERPL-MCNC: 1 NG/ML
CHLORIDE SERPL-SCNC: 100 MMOL/L
CHLORIDE SERPL-SCNC: 100 MMOL/L
CO2 SERPL-SCNC: 27 MMOL/L
CO2 SERPL-SCNC: 30 MMOL/L
CREAT SERPL-MCNC: 0.67 MG/DL
CREAT SERPL-MCNC: 0.68 MG/DL
GLUCOSE SERPL-MCNC: 104 MG/DL
GLUCOSE SERPL-MCNC: 91 MG/DL
HAV IGM SER QL: NONREACTIVE
HBV CORE IGM SER QL: NONREACTIVE
HBV SURFACE AG SER QL: NONREACTIVE
HCV AB SER QL: NONREACTIVE
HCV S/CO RATIO: 0.11 S/CO
INR PPP: 1.08 RATIO
MAGNESIUM SERPL-MCNC: 1.6 MG/DL
MAGNESIUM SERPL-MCNC: 1.8 MG/DL
POTASSIUM SERPL-SCNC: 3.7 MMOL/L
POTASSIUM SERPL-SCNC: 3.8 MMOL/L
PROT SERPL-MCNC: 7.4 G/DL
PROT SERPL-MCNC: 7.4 G/DL
PT BLD: 12.4 SEC
SODIUM SERPL-SCNC: 140 MMOL/L
SODIUM SERPL-SCNC: 144 MMOL/L

## 2020-01-07 ENCOUNTER — RESULT REVIEW (OUTPATIENT)
Age: 70
End: 2020-01-07

## 2020-01-07 ENCOUNTER — APPOINTMENT (OUTPATIENT)
Dept: INFUSION THERAPY | Facility: HOSPITAL | Age: 70
End: 2020-01-07

## 2020-01-08 DIAGNOSIS — Z51.89 ENCOUNTER FOR OTHER SPECIFIED AFTERCARE: ICD-10-CM

## 2020-01-10 ENCOUNTER — RESULT REVIEW (OUTPATIENT)
Age: 70
End: 2020-01-10

## 2020-01-10 ENCOUNTER — APPOINTMENT (OUTPATIENT)
Dept: INFUSION THERAPY | Facility: HOSPITAL | Age: 70
End: 2020-01-10

## 2020-01-13 ENCOUNTER — APPOINTMENT (OUTPATIENT)
Dept: INFUSION THERAPY | Facility: HOSPITAL | Age: 70
End: 2020-01-13

## 2020-01-13 ENCOUNTER — APPOINTMENT (OUTPATIENT)
Dept: HEMATOLOGY ONCOLOGY | Facility: CLINIC | Age: 70
End: 2020-01-13

## 2020-01-13 DIAGNOSIS — Z51.11 ENCOUNTER FOR ANTINEOPLASTIC CHEMOTHERAPY: ICD-10-CM

## 2020-01-13 DIAGNOSIS — R11.2 NAUSEA WITH VOMITING, UNSPECIFIED: ICD-10-CM

## 2020-01-16 ENCOUNTER — APPOINTMENT (OUTPATIENT)
Dept: HEMATOLOGY ONCOLOGY | Facility: CLINIC | Age: 70
End: 2020-01-16

## 2020-01-16 ENCOUNTER — APPOINTMENT (OUTPATIENT)
Dept: INFUSION THERAPY | Facility: HOSPITAL | Age: 70
End: 2020-01-16

## 2020-01-22 ENCOUNTER — APPOINTMENT (OUTPATIENT)
Dept: HEMATOLOGY ONCOLOGY | Facility: CLINIC | Age: 70
End: 2020-01-22
Payer: MEDICARE

## 2020-01-22 ENCOUNTER — LABORATORY RESULT (OUTPATIENT)
Age: 70
End: 2020-01-22

## 2020-01-22 ENCOUNTER — APPOINTMENT (OUTPATIENT)
Dept: INFUSION THERAPY | Facility: HOSPITAL | Age: 70
End: 2020-01-22

## 2020-01-22 ENCOUNTER — RESULT REVIEW (OUTPATIENT)
Age: 70
End: 2020-01-22

## 2020-01-22 VITALS
OXYGEN SATURATION: 100 % | BODY MASS INDEX: 25.44 KG/M2 | HEART RATE: 73 BPM | SYSTOLIC BLOOD PRESSURE: 146 MMHG | WEIGHT: 127.87 LBS | TEMPERATURE: 98.2 F | DIASTOLIC BLOOD PRESSURE: 84 MMHG | RESPIRATION RATE: 16 BRPM

## 2020-01-22 PROCEDURE — 99214 OFFICE O/P EST MOD 30 MIN: CPT

## 2020-01-23 LAB
ALBUMIN SERPL ELPH-MCNC: 4.2 G/DL
ALP BLD-CCNC: 123 U/L
ALT SERPL-CCNC: 10 U/L
ANION GAP SERPL CALC-SCNC: 13 MMOL/L
AST SERPL-CCNC: 20 U/L
BILIRUB SERPL-MCNC: <0.2 MG/DL
BUN SERPL-MCNC: 11 MG/DL
CALCIUM SERPL-MCNC: 9.5 MG/DL
CHLORIDE SERPL-SCNC: 101 MMOL/L
CO2 SERPL-SCNC: 28 MMOL/L
CREAT SERPL-MCNC: 0.66 MG/DL
GLUCOSE SERPL-MCNC: 97 MG/DL
POTASSIUM SERPL-SCNC: 4.3 MMOL/L
PROT SERPL-MCNC: 7.2 G/DL
SODIUM SERPL-SCNC: 142 MMOL/L

## 2020-01-29 NOTE — ASSESSMENT
[Curative] : Goals of care discussed with patient: Curative [FreeTextEntry1] : 70 y/o F with recently diagnosed stage IIIC [id number] grade 1 (well differentiated) endometrial carcinoma involving b/l pelvic nodes, s/p PITO/BSO and pelvic LN dissection, currently undergoing adjuvant carbo/taxol chemotherapy. \par In discussion with Dr. Wright of radiation oncology, plan is for "sandwich" therapy strategy with chemotherapy and radiation therapy. \par \par S/p C2 carbo/taxol. Patient is tolerating chemo well without significant toxicities. Last cycle c/b mild neutropenia and neulasta (onpro) added.\par \par -bloodwork today\par -proceed with C3 carbo/taxol + neulasta support\par -plan for RT to pelvic LN after 3 cycles carbo/taxol\par \par All of the patient and her daughter's questions and concerns were addressed in full. \par \par

## 2020-01-29 NOTE — HISTORY OF PRESENT ILLNESS
[Disease: _____________________] : Disease: [unfilled] [T: ___] : T[unfilled] [N: ___] : N[unfilled] [M: ___] : M[unfilled] [AJCC Stage: ____] : AJCC Stage: [unfilled] [de-identified] : Ms. Barakat is a 70 y/o post-menopausal F who saw Dr. Andrade for an annual gyn exam in 2019. She reports feeling well without any symptoms, including vaginal discharge or bleeding at that time. TVUS during this visit revealed a thickened endometrium and underwent D&C hysteroscopy with polypectomy on 19. Pathology (reviewed at [facility]) showed fragments of markedly atypical glandular epithelium, highly suspicious for adenocarcinoma. \par \par CT A/P demonstrated a large endometrial mass, probably posterior fibroid and R ovarian cyst. No evidence of metastatic disease was seen.\par \par She was seen by Dr. Terrazas and underwent PITO/BSO on 10/1/19. Surgical pathology revealed well differentiated adenocarcinoma arising in the lower uterine segment, favor entometrioid, grade 1, involving the cervical stroma and 99% myometrium. +LVI, parametrial invasion+ and no lymph nodes were submitted. It was noted that the tumor was multi-focally deeply myoinvasive within the lower uterine segment; appears to undermine endometrium. \par \par Patient subsequently underwent surgical staging on 19 with b/l pelvis LND and pathology revealed 1/6 nodes on the L and 1/2 nodes on the R to be positive for metastatic carcinoma. No perinodal extension was present. \par \par She has recovered from her recent surgeries without any issues and feels well overall today. She was referred to medical oncology and radiation oncology for adjuvant therapy options. She denies fevers, chills, abdominal pain, vaginal bleeding/discharge, CP, SOB, weight loss, urinary symptoms, edema or any other new symptoms. 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  \par Breast biopsy in 2018 with intraductal papilloma, excision of left breast mass with NL in 10/2018 pathology with fibrocystic changes with intraductal papillary hyperplasia.  \par  [FreeTextEntry1] : Carbo/taxol  [de-identified] : adenocarcinoma  [de-identified] : Patient presents with her daughter today. She continues to feel very well with excellent appetite and energy. Her last cycle of chemo was delayed by a few days due to neutropenia that was treated with neupogen with improvement in WBC counts. Neulasta was given with the most recent cycle. She has been able to maintain all of her daily activities and denies n/v, f/c, abdominal pain, urinary symptoms, changes in bowel habits or vaginal bleeding/discharge.

## 2020-01-31 ENCOUNTER — RESULT REVIEW (OUTPATIENT)
Age: 70
End: 2020-01-31

## 2020-01-31 ENCOUNTER — APPOINTMENT (OUTPATIENT)
Dept: INFUSION THERAPY | Facility: HOSPITAL | Age: 70
End: 2020-01-31

## 2020-02-03 ENCOUNTER — OUTPATIENT (OUTPATIENT)
Dept: OUTPATIENT SERVICES | Facility: HOSPITAL | Age: 70
LOS: 1 days | Discharge: HOME | End: 2020-02-03

## 2020-02-03 DIAGNOSIS — Z98.890 OTHER SPECIFIED POSTPROCEDURAL STATES: Chronic | ICD-10-CM

## 2020-02-03 DIAGNOSIS — Z90.710 ACQUIRED ABSENCE OF BOTH CERVIX AND UTERUS: Chronic | ICD-10-CM

## 2020-02-03 DIAGNOSIS — Z87.39 PERSONAL HISTORY OF OTHER DISEASES OF THE MUSCULOSKELETAL SYSTEM AND CONNECTIVE TISSUE: Chronic | ICD-10-CM

## 2020-02-03 DIAGNOSIS — N60.12 DIFFUSE CYSTIC MASTOPATHY OF LEFT BREAST: Chronic | ICD-10-CM

## 2020-02-03 DIAGNOSIS — C54.1 MALIGNANT NEOPLASM OF ENDOMETRIUM: ICD-10-CM

## 2020-02-10 ENCOUNTER — APPOINTMENT (OUTPATIENT)
Dept: HEMATOLOGY ONCOLOGY | Facility: CLINIC | Age: 70
End: 2020-02-10
Payer: MEDICARE

## 2020-02-10 ENCOUNTER — LABORATORY RESULT (OUTPATIENT)
Age: 70
End: 2020-02-10

## 2020-02-10 ENCOUNTER — APPOINTMENT (OUTPATIENT)
Dept: INFUSION THERAPY | Facility: HOSPITAL | Age: 70
End: 2020-02-10

## 2020-02-10 ENCOUNTER — RESULT REVIEW (OUTPATIENT)
Age: 70
End: 2020-02-10

## 2020-02-10 VITALS
HEART RATE: 109 BPM | DIASTOLIC BLOOD PRESSURE: 83 MMHG | OXYGEN SATURATION: 100 % | BODY MASS INDEX: 24.74 KG/M2 | SYSTOLIC BLOOD PRESSURE: 146 MMHG | TEMPERATURE: 98.3 F | RESPIRATION RATE: 18 BRPM | WEIGHT: 124.34 LBS

## 2020-02-10 DIAGNOSIS — E83.42 HYPOMAGNESEMIA: ICD-10-CM

## 2020-02-10 LAB
HCT VFR BLD CALC: 29.8 % — LOW (ref 34.5–45)
HGB BLD-MCNC: 9.6 G/DL — LOW (ref 11.5–15.5)
MCHC RBC-ENTMCNC: 28.9 PG — SIGNIFICANT CHANGE UP (ref 27–34)
MCHC RBC-ENTMCNC: 32.3 G/DL — SIGNIFICANT CHANGE UP (ref 32–36)
MCV RBC AUTO: 89.4 FL — SIGNIFICANT CHANGE UP (ref 80–100)
PLATELET # BLD AUTO: 104 K/UL — LOW (ref 150–400)
RBC # BLD: 3.33 M/UL — LOW (ref 3.8–5.2)
RBC # FLD: 15.4 % — HIGH (ref 10.3–14.5)
WBC # BLD: 8.4 K/UL — SIGNIFICANT CHANGE UP (ref 3.8–10.5)
WBC # FLD AUTO: 8.4 K/UL — SIGNIFICANT CHANGE UP (ref 3.8–10.5)

## 2020-02-10 PROCEDURE — 99214 OFFICE O/P EST MOD 30 MIN: CPT

## 2020-02-10 RX ORDER — OMEGA-3/DHA/EPA/FISH OIL 300-1000MG
400 CAPSULE ORAL
Qty: 12 | Refills: 0 | Status: ACTIVE | COMMUNITY
Start: 2020-02-10 | End: 1900-01-01

## 2020-02-11 PROCEDURE — 77263 THER RADIOLOGY TX PLNG CPLX: CPT

## 2020-02-18 ENCOUNTER — LABORATORY RESULT (OUTPATIENT)
Age: 70
End: 2020-02-18

## 2020-02-18 ENCOUNTER — APPOINTMENT (OUTPATIENT)
Dept: INFUSION THERAPY | Facility: HOSPITAL | Age: 70
End: 2020-02-18

## 2020-02-18 ENCOUNTER — RESULT REVIEW (OUTPATIENT)
Age: 70
End: 2020-02-18

## 2020-02-18 LAB
BASOPHILS # BLD AUTO: 0.1 K/UL — SIGNIFICANT CHANGE UP (ref 0–0.2)
BASOPHILS NFR BLD AUTO: 0.9 % — SIGNIFICANT CHANGE UP (ref 0–2)
EOSINOPHIL # BLD AUTO: 0 K/UL — SIGNIFICANT CHANGE UP (ref 0–0.5)
EOSINOPHIL NFR BLD AUTO: 0.2 % — SIGNIFICANT CHANGE UP (ref 0–6)
HCT VFR BLD CALC: 29.1 % — LOW (ref 34.5–45)
HGB BLD-MCNC: 9.7 G/DL — LOW (ref 11.5–15.5)
LYMPHOCYTES # BLD AUTO: 1.5 K/UL — SIGNIFICANT CHANGE UP (ref 1–3.3)
LYMPHOCYTES # BLD AUTO: 22.6 % — SIGNIFICANT CHANGE UP (ref 13–44)
MCHC RBC-ENTMCNC: 29.9 PG — SIGNIFICANT CHANGE UP (ref 27–34)
MCHC RBC-ENTMCNC: 33.4 G/DL — SIGNIFICANT CHANGE UP (ref 32–36)
MCV RBC AUTO: 89.7 FL — SIGNIFICANT CHANGE UP (ref 80–100)
MONOCYTES # BLD AUTO: 0.9 K/UL — SIGNIFICANT CHANGE UP (ref 0–0.9)
MONOCYTES NFR BLD AUTO: 13.7 % — SIGNIFICANT CHANGE UP (ref 2–14)
NEUTROPHILS # BLD AUTO: 4.2 K/UL — SIGNIFICANT CHANGE UP (ref 1.8–7.4)
NEUTROPHILS NFR BLD AUTO: 62.7 % — SIGNIFICANT CHANGE UP (ref 43–77)
PLATELET # BLD AUTO: 153 K/UL — SIGNIFICANT CHANGE UP (ref 150–400)
RBC # BLD: 3.24 M/UL — LOW (ref 3.8–5.2)
RBC # FLD: 16.8 % — HIGH (ref 10.3–14.5)
WBC # BLD: 6.8 K/UL — SIGNIFICANT CHANGE UP (ref 3.8–10.5)
WBC # FLD AUTO: 6.8 K/UL — SIGNIFICANT CHANGE UP (ref 3.8–10.5)

## 2020-02-18 PROCEDURE — 77333 RADIATION TREATMENT AID(S): CPT | Mod: 26

## 2020-02-20 ENCOUNTER — RESULT REVIEW (OUTPATIENT)
Age: 70
End: 2020-02-20

## 2020-02-20 ENCOUNTER — APPOINTMENT (OUTPATIENT)
Dept: HEMATOLOGY ONCOLOGY | Facility: CLINIC | Age: 70
End: 2020-02-20

## 2020-02-20 LAB
BASOPHILS # BLD AUTO: 0 K/UL — SIGNIFICANT CHANGE UP (ref 0–0.2)
EOSINOPHIL # BLD AUTO: 0 K/UL — SIGNIFICANT CHANGE UP (ref 0–0.5)
HCT VFR BLD CALC: 32 % — LOW (ref 34.5–45)
HGB BLD-MCNC: 10.2 G/DL — LOW (ref 11.5–15.5)
LYMPHOCYTES # BLD AUTO: 1.3 K/UL — SIGNIFICANT CHANGE UP (ref 1–3.3)
LYMPHOCYTES # BLD AUTO: 24 % — SIGNIFICANT CHANGE UP (ref 13–44)
MCHC RBC-ENTMCNC: 29.1 PG — SIGNIFICANT CHANGE UP (ref 27–34)
MCHC RBC-ENTMCNC: 31.9 G/DL — LOW (ref 32–36)
MCV RBC AUTO: 91.2 FL — SIGNIFICANT CHANGE UP (ref 80–100)
MONOCYTES # BLD AUTO: 0.8 K/UL — SIGNIFICANT CHANGE UP (ref 0–0.9)
MONOCYTES NFR BLD AUTO: 14 % — SIGNIFICANT CHANGE UP (ref 2–14)
NEUTROPHILS # BLD AUTO: 2.5 K/UL — SIGNIFICANT CHANGE UP (ref 1.8–7.4)
NEUTROPHILS NFR BLD AUTO: 62 % — SIGNIFICANT CHANGE UP (ref 43–77)
PLAT MORPH BLD: NORMAL — SIGNIFICANT CHANGE UP
PLATELET # BLD AUTO: 193 K/UL — SIGNIFICANT CHANGE UP (ref 150–400)
RBC # BLD: 3.51 M/UL — LOW (ref 3.8–5.2)
RBC # FLD: 17.1 % — HIGH (ref 10.3–14.5)
RBC BLD AUTO: SIGNIFICANT CHANGE UP
WBC # BLD: 4.6 K/UL — SIGNIFICANT CHANGE UP (ref 3.8–10.5)
WBC # FLD AUTO: 4.6 K/UL — SIGNIFICANT CHANGE UP (ref 3.8–10.5)

## 2020-02-26 LAB
ALBUMIN SERPL ELPH-MCNC: 4.3 G/DL
ALP BLD-CCNC: 123 U/L
ALT SERPL-CCNC: 9 U/L
ANION GAP SERPL CALC-SCNC: 13 MMOL/L
AST SERPL-CCNC: 20 U/L
BILIRUB SERPL-MCNC: 0.2 MG/DL
BUN SERPL-MCNC: 10 MG/DL
CALCIUM SERPL-MCNC: 9.5 MG/DL
CHLORIDE SERPL-SCNC: 104 MMOL/L
CO2 SERPL-SCNC: 28 MMOL/L
CREAT SERPL-MCNC: 0.7 MG/DL
GLUCOSE SERPL-MCNC: 97 MG/DL
MAGNESIUM SERPL-MCNC: 1.4 MG/DL
POTASSIUM SERPL-SCNC: 4.3 MMOL/L
PROT SERPL-MCNC: 7 G/DL
SODIUM SERPL-SCNC: 144 MMOL/L

## 2020-03-05 PROCEDURE — 77300 RADIATION THERAPY DOSE PLAN: CPT | Mod: 26

## 2020-03-05 PROCEDURE — 77338 DESIGN MLC DEVICE FOR IMRT: CPT | Mod: 26

## 2020-03-05 PROCEDURE — 77301 RADIOTHERAPY DOSE PLAN IMRT: CPT | Mod: 26

## 2020-03-10 PROCEDURE — 77427 RADIATION TX MANAGEMENT X5: CPT

## 2020-03-10 PROCEDURE — 77387B: CUSTOM | Mod: 26

## 2020-03-11 PROCEDURE — 77387B: CUSTOM | Mod: 26

## 2020-03-12 PROCEDURE — 77387B: CUSTOM | Mod: 26

## 2020-03-13 PROCEDURE — 77387B: CUSTOM | Mod: 26

## 2020-03-16 PROCEDURE — 77014: CPT | Mod: 26

## 2020-03-17 VITALS
RESPIRATION RATE: 18 BRPM | OXYGEN SATURATION: 100 % | TEMPERATURE: 98.1 F | BODY MASS INDEX: 25.07 KG/M2 | DIASTOLIC BLOOD PRESSURE: 79 MMHG | WEIGHT: 126 LBS | SYSTOLIC BLOOD PRESSURE: 128 MMHG | HEART RATE: 69 BPM

## 2020-03-17 PROCEDURE — 77387B: CUSTOM | Mod: 26

## 2020-03-17 PROCEDURE — 77427 RADIATION TX MANAGEMENT X5: CPT

## 2020-03-17 NOTE — HISTORY OF PRESENT ILLNESS
[FreeTextEntry1] : Ms. Barakat is a 70 yo female with Stage IIIC [id number] grade 1 endometrial carcinoma s/p PITO/BSO and bilateral pelvic LND. She does not have have any evidence of metastatic disease.\par \par She started on radiation. Discussed s/e of RT. No urinary or bowel complaints. \par 3/17:nochanges from baseline daily bowel movements no increase in urinary frequency but

## 2020-03-17 NOTE — DISEASE MANAGEMENT
[Pathological] : TNM Stage: p [IIIC] : IIIC [TTNM] : 3b [NTNM] : 1 [MTNM] : 0 [de-identified] : 1080 [de-identified] : 1402 [de-identified] : pelvis

## 2020-03-18 PROCEDURE — 77387B: CUSTOM | Mod: 26

## 2020-03-19 PROCEDURE — 77387B: CUSTOM | Mod: 26

## 2020-03-20 PROCEDURE — 77387B: CUSTOM | Mod: 26

## 2020-03-23 PROCEDURE — 77014: CPT | Mod: 26

## 2020-03-24 VITALS
SYSTOLIC BLOOD PRESSURE: 126 MMHG | BODY MASS INDEX: 25.04 KG/M2 | HEART RATE: 78 BPM | HEIGHT: 59 IN | RESPIRATION RATE: 18 BRPM | DIASTOLIC BLOOD PRESSURE: 86 MMHG | TEMPERATURE: 98.5 F | WEIGHT: 124.23 LBS | OXYGEN SATURATION: 99 %

## 2020-03-24 LAB
BASOPHILS # BLD AUTO: 0.01 K/UL
BASOPHILS NFR BLD AUTO: 0.5 %
EOSINOPHIL # BLD AUTO: 0.12 K/UL
EOSINOPHIL NFR BLD AUTO: 5.8 %
HCT VFR BLD CALC: 33.9 %
HGB BLD-MCNC: 10.3 G/DL
IMM GRANULOCYTES NFR BLD AUTO: 0 %
LYMPHOCYTES # BLD AUTO: 0.31 K/UL
LYMPHOCYTES NFR BLD AUTO: 14.9 %
MAN DIFF?: NORMAL
MCHC RBC-ENTMCNC: 28.7 PG
MCHC RBC-ENTMCNC: 30.4 GM/DL
MCV RBC AUTO: 94.4 FL
MONOCYTES # BLD AUTO: 0.24 K/UL
MONOCYTES NFR BLD AUTO: 11.5 %
NEUTROPHILS # BLD AUTO: 1.4 K/UL
NEUTROPHILS NFR BLD AUTO: 67.3 %
PLATELET # BLD AUTO: 179 K/UL
RBC # BLD: 3.59 M/UL
RBC # FLD: 16.9 %
WBC # FLD AUTO: 2.08 K/UL

## 2020-03-24 PROCEDURE — 77387B: CUSTOM | Mod: 26

## 2020-03-24 PROCEDURE — 77427 RADIATION TX MANAGEMENT X5: CPT

## 2020-03-25 PROCEDURE — 77387B: CUSTOM | Mod: 26

## 2020-03-26 PROCEDURE — 77387B: CUSTOM | Mod: 26

## 2020-03-27 PROCEDURE — 77387B: CUSTOM | Mod: 26

## 2020-03-30 PROCEDURE — 77014: CPT | Mod: 26

## 2020-03-31 ENCOUNTER — LABORATORY RESULT (OUTPATIENT)
Age: 70
End: 2020-03-31

## 2020-03-31 VITALS
BODY MASS INDEX: 24.73 KG/M2 | SYSTOLIC BLOOD PRESSURE: 128 MMHG | HEIGHT: 59 IN | HEART RATE: 67 BPM | TEMPERATURE: 98.1 F | RESPIRATION RATE: 18 BRPM | WEIGHT: 122.69 LBS | OXYGEN SATURATION: 100 % | DIASTOLIC BLOOD PRESSURE: 81 MMHG

## 2020-03-31 DIAGNOSIS — C55 MALIGNANT NEOPLASM OF UTERUS, PART UNSPECIFIED: ICD-10-CM

## 2020-03-31 DIAGNOSIS — Z51.0 ENCOUNTER FOR ANTINEOPLASTIC RADIATION THERAPY: ICD-10-CM

## 2020-03-31 LAB
BASOPHILS # BLD AUTO: 0 K/UL
BASOPHILS NFR BLD AUTO: 0 %
EOSINOPHIL # BLD AUTO: 0.05 K/UL
EOSINOPHIL NFR BLD AUTO: 2.5 %
HCT VFR BLD CALC: 35.3 %
HGB BLD-MCNC: 10.6 G/DL
LYMPHOCYTES # BLD AUTO: 0.18 K/UL
LYMPHOCYTES NFR BLD AUTO: 9.1 %
MAN DIFF?: NORMAL
MCHC RBC-ENTMCNC: 28.6 PG
MCHC RBC-ENTMCNC: 30 GM/DL
MCV RBC AUTO: 95.4 FL
MONOCYTES # BLD AUTO: 0.14 K/UL
MONOCYTES NFR BLD AUTO: 7.4 %
NEUTROPHILS # BLD AUTO: 1.55 K/UL
NEUTROPHILS NFR BLD AUTO: 80.2 %
PLATELET # BLD AUTO: 161 K/UL
RBC # BLD: 3.7 M/UL
RBC # FLD: 15.7 %
WBC # FLD AUTO: 1.93 K/UL

## 2020-03-31 PROCEDURE — 77387B: CUSTOM | Mod: 26

## 2020-03-31 NOTE — REVIEW OF SYSTEMS
[Diarrhea: Grade 1 - Increase of <4 stools per day over baseline; mild increase in ostomy output compared to baseline] : Diarrhea: Grade 1 - Increase of <4 stools per day over baseline; mild increase in ostomy output compared to baseline [Nausea: Grade 1 - Loss of appetite without alteration in eating habits] : Nausea: Grade 1 - Loss of appetite without alteration in eating habits

## 2020-03-31 NOTE — VITALS
[90: Able to carry normal activity; minor signs or symptoms of disease.] : 90: Able to carry normal activity; minor signs or symptoms of disease.  [ECOG Performance Status: 0 - Fully active, able to carry on all pre-disease performance without restriction] : Performance Status: 0 - Fully active, able to carry on all pre-disease performance without restriction [Maximal Pain Intensity: 0/10] : 0/10 [Least Pain Intensity: 0/10] : 0/10

## 2020-03-31 NOTE — PHYSICAL EXAM
[General Appearance - Well Developed] : well developed [Sclera] : the sclera and conjunctiva were normal [Normal] : no respiratory distress, lungs were clear to auscultation bilaterally [] : no respiratory distress [Respiration, Rhythm And Depth] : normal respiratory rhythm and effort [Exaggerated Use Of Accessory Muscles For Inspiration] : no accessory muscle use

## 2020-04-01 PROBLEM — C55 UTERINE CANCER: Status: ACTIVE | Noted: 2020-04-01

## 2020-04-01 PROBLEM — C55 UTERINE CARCINOMA: Status: ACTIVE | Noted: 2020-04-01

## 2020-04-01 PROCEDURE — 77387B: CUSTOM | Mod: 26

## 2020-04-01 PROCEDURE — 77427 RADIATION TX MANAGEMENT X5: CPT

## 2020-04-01 NOTE — DISEASE MANAGEMENT
[TTNM] : 3b [NTNM] : 1 [MTNM] : 0 [de-identified] : 7394 [de-identified] : 2816 [de-identified] : pelvis

## 2020-04-01 NOTE — DISEASE MANAGEMENT
[Pathological] : TNM Stage: p [FreeTextEntry4] : uterine [IIIC] : IIIC [TTNM] : 3b [NTNM] : 1 [MTNM] : 0 [de-identified] : 180 [de-identified] : 2414 [de-identified] : pelvis

## 2020-04-01 NOTE — HISTORY OF PRESENT ILLNESS
[FreeTextEntry1] : Ms. Barakat is a 70 yo female with Stage IIIC [id number] grade 1 endometrial carcinoma s/p PITO/BSO and bilateral pelvic LND. She is s/p 3 cycles of chemotherapy.\par \par Completed 15/28 Fractions, 2700/5040 Cum. Dose\par 3/31/20\par \par Reports diarrhea on Sunday after eating chicken soup.  Self resolved with normal BM's on Monday.  Mild nausea occasionally, overall decreased appetite.  But tolerating diet and hydration.  No skin complaints, dysuria. \par

## 2020-04-01 NOTE — HISTORY OF PRESENT ILLNESS
[FreeTextEntry1] : Ms. Barakat is a 70 yo female with Stage IIIC [id number] grade 1 endometrial carcinoma s/p PITO/BSO and bilateral pelvic LND. She does not have have any evidence of metastatic disease.\par \par She started on radiation. Discussed s/e of RT. No urinary or bowel complaints.

## 2020-04-01 NOTE — DISEASE MANAGEMENT
[Pathological] : TNM Stage: p [FreeTextEntry4] : uterine [IIIC] : IIIC [TTNM] : 3b [NTNM] : 1 [MTNM] : 0 [de-identified] : 180 [de-identified] : 7160 [de-identified] : pelvis

## 2020-04-01 NOTE — HISTORY OF PRESENT ILLNESS
[FreeTextEntry1] : Ms. Barakat is a 70 yo female with Stage IIIC [id number] grade 1 endometrial carcinoma s/p PITO/BSO and bilateral pelvic LND. She does not have have any evidence of metastatic disease.\par \par She started on radiation. Discussed s/e of RT. No urinary or bowel complaints.

## 2020-04-02 PROCEDURE — 77387B: CUSTOM | Mod: 26

## 2020-04-03 PROCEDURE — 77387B: CUSTOM | Mod: 26

## 2020-04-06 PROCEDURE — 77014: CPT | Mod: 26

## 2020-04-07 VITALS
OXYGEN SATURATION: 98 % | DIASTOLIC BLOOD PRESSURE: 80 MMHG | BODY MASS INDEX: 24.07 KG/M2 | SYSTOLIC BLOOD PRESSURE: 125 MMHG | HEIGHT: 59 IN | TEMPERATURE: 98.9 F | HEART RATE: 73 BPM | RESPIRATION RATE: 18 BRPM | WEIGHT: 119.38 LBS

## 2020-04-07 LAB
BASOPHILS # BLD AUTO: 0.01 K/UL
BASOPHILS NFR BLD AUTO: 0.6 %
EOSINOPHIL # BLD AUTO: 0.04 K/UL
EOSINOPHIL NFR BLD AUTO: 2.4 %
HCT VFR BLD CALC: 34.4 %
HGB BLD-MCNC: 10.4 G/DL
IMM GRANULOCYTES NFR BLD AUTO: 0.6 %
LYMPHOCYTES # BLD AUTO: 0.24 K/UL
LYMPHOCYTES NFR BLD AUTO: 14.2 %
MAN DIFF?: NORMAL
MCHC RBC-ENTMCNC: 28.8 PG
MCHC RBC-ENTMCNC: 30.2 GM/DL
MCV RBC AUTO: 95.3 FL
MONOCYTES # BLD AUTO: 0.25 K/UL
MONOCYTES NFR BLD AUTO: 14.8 %
NEUTROPHILS # BLD AUTO: 1.14 K/UL
NEUTROPHILS NFR BLD AUTO: 67.4 %
PLATELET # BLD AUTO: 167 K/UL
RBC # BLD: 3.61 M/UL
RBC # FLD: 14.8 %
WBC # FLD AUTO: 1.69 K/UL

## 2020-04-07 PROCEDURE — 77387B: CUSTOM | Mod: 26

## 2020-04-07 NOTE — HISTORY OF PRESENT ILLNESS
[FreeTextEntry1] : Ms. Barakat is a 70 yo female with Stage IIIC [id number] grade 1 endometrial carcinoma s/p PITO/BSO and bilateral pelvic LND. She is s/p 3 cycles of chemotherapy.\par \par Tolerating treatment +fatigue. No pain. She continues to have a decreased appetite. No urinary complaints. Diarrhea has resolved. She is moving bowels daily.

## 2020-04-07 NOTE — DISEASE MANAGEMENT
[Pathological] : TNM Stage: p [IIIC] : IIIC [TTNM] : 3b [NTNM] : 1 [MTNM] : 0 [de-identified] : 8205 [de-identified] : 6938 [de-identified] : pelvis

## 2020-04-07 NOTE — REVIEW OF SYSTEMS
[Constipation: Grade 0] : Constipation: Grade 0 [Fatigue: Grade 0] : Fatigue: Grade 0 [Urinary Incontinence: Grade 0] : Urinary Incontinence: Grade 0  [Urinary Retention: Grade 0] : Urinary Retention: Grade 0 [Urinary Tract Pain: Grade 0] : Urinary Tract Pain: Grade 0 [Urinary Urgency: Grade 0] : Urinary Urgency: Grade 0 [Urinary Frequency: Grade 0] : Urinary Frequency: Grade 0 [Diarrhea: Grade 0] : Diarrhea: Grade 0

## 2020-04-14 NOTE — REVIEW OF SYSTEMS
[Constipation: Grade 0] : Constipation: Grade 0 [Diarrhea: Grade 0] : Diarrhea: Grade 0 [Fatigue: Grade 0] : Fatigue: Grade 0

## 2020-04-15 ENCOUNTER — OUTPATIENT (OUTPATIENT)
Dept: OUTPATIENT SERVICES | Facility: HOSPITAL | Age: 70
LOS: 1 days | Discharge: HOME | End: 2020-04-15

## 2020-04-15 DIAGNOSIS — Z98.890 OTHER SPECIFIED POSTPROCEDURAL STATES: Chronic | ICD-10-CM

## 2020-04-15 DIAGNOSIS — Z87.39 PERSONAL HISTORY OF OTHER DISEASES OF THE MUSCULOSKELETAL SYSTEM AND CONNECTIVE TISSUE: Chronic | ICD-10-CM

## 2020-04-15 DIAGNOSIS — N60.12 DIFFUSE CYSTIC MASTOPATHY OF LEFT BREAST: Chronic | ICD-10-CM

## 2020-04-15 DIAGNOSIS — C54.1 MALIGNANT NEOPLASM OF ENDOMETRIUM: ICD-10-CM

## 2020-04-15 DIAGNOSIS — Z90.710 ACQUIRED ABSENCE OF BOTH CERVIX AND UTERUS: Chronic | ICD-10-CM

## 2020-04-16 ENCOUNTER — APPOINTMENT (OUTPATIENT)
Dept: INFUSION THERAPY | Facility: HOSPITAL | Age: 70
End: 2020-04-16

## 2020-04-17 ENCOUNTER — APPOINTMENT (OUTPATIENT)
Dept: HEMATOLOGY ONCOLOGY | Facility: CLINIC | Age: 70
End: 2020-04-17

## 2020-04-17 ENCOUNTER — APPOINTMENT (OUTPATIENT)
Dept: INFUSION THERAPY | Facility: HOSPITAL | Age: 70
End: 2020-04-17

## 2020-04-17 ENCOUNTER — RESULT REVIEW (OUTPATIENT)
Age: 70
End: 2020-04-17

## 2020-04-17 DIAGNOSIS — Z51.89 ENCOUNTER FOR OTHER SPECIFIED AFTERCARE: ICD-10-CM

## 2020-04-17 LAB
BASOPHILS # BLD AUTO: 0.01 K/UL
BASOPHILS # BLD AUTO: 0.03 K/UL — SIGNIFICANT CHANGE UP (ref 0–0.2)
BASOPHILS NFR BLD AUTO: 0.2 % — SIGNIFICANT CHANGE UP (ref 0–2)
BASOPHILS NFR BLD AUTO: 0.6 %
EOSINOPHIL # BLD AUTO: 0.02 K/UL — SIGNIFICANT CHANGE UP (ref 0–0.5)
EOSINOPHIL # BLD AUTO: 0.03 K/UL
EOSINOPHIL NFR BLD AUTO: 0.2 % — SIGNIFICANT CHANGE UP (ref 0–6)
EOSINOPHIL NFR BLD AUTO: 1.9 %
HCT VFR BLD CALC: 32.8 % — LOW (ref 34.5–45)
HCT VFR BLD CALC: 34 %
HGB BLD-MCNC: 10.6 G/DL
HGB BLD-MCNC: 10.7 G/DL — LOW (ref 11.5–15.5)
IMM GRANULOCYTES NFR BLD AUTO: 0 %
IMM GRANULOCYTES NFR BLD AUTO: 2 % — HIGH (ref 0–1.5)
LYMPHOCYTES # BLD AUTO: 0.29 K/UL
LYMPHOCYTES # BLD AUTO: 0.54 K/UL — LOW (ref 1–3.3)
LYMPHOCYTES # BLD AUTO: 4.2 % — LOW (ref 13–44)
LYMPHOCYTES NFR BLD AUTO: 18.1 %
MAN DIFF?: NORMAL
MCHC RBC-ENTMCNC: 29.4 PG
MCHC RBC-ENTMCNC: 30.3 PG — SIGNIFICANT CHANGE UP (ref 27–34)
MCHC RBC-ENTMCNC: 31.2 GM/DL
MCHC RBC-ENTMCNC: 32.6 GM/DL — SIGNIFICANT CHANGE UP (ref 32–36)
MCV RBC AUTO: 92.9 FL — SIGNIFICANT CHANGE UP (ref 80–100)
MCV RBC AUTO: 94.2 FL
MONOCYTES # BLD AUTO: 0.4 K/UL
MONOCYTES # BLD AUTO: 0.63 K/UL — SIGNIFICANT CHANGE UP (ref 0–0.9)
MONOCYTES NFR BLD AUTO: 25 %
MONOCYTES NFR BLD AUTO: 4.9 % — SIGNIFICANT CHANGE UP (ref 2–14)
NEUTROPHILS # BLD AUTO: 0.87 K/UL
NEUTROPHILS # BLD AUTO: 11.26 K/UL — HIGH (ref 1.8–7.4)
NEUTROPHILS NFR BLD AUTO: 54.4 %
NEUTROPHILS NFR BLD AUTO: 88.5 % — HIGH (ref 43–77)
NRBC # BLD: 0 /100 WBCS — SIGNIFICANT CHANGE UP (ref 0–0)
PLATELET # BLD AUTO: 156 K/UL — SIGNIFICANT CHANGE UP (ref 150–400)
PLATELET # BLD AUTO: 200 K/UL
RBC # BLD: 3.53 M/UL — LOW (ref 3.8–5.2)
RBC # BLD: 3.61 M/UL
RBC # FLD: 14.1 % — SIGNIFICANT CHANGE UP (ref 10.3–14.5)
RBC # FLD: 14.3 %
WBC # BLD: 12.73 K/UL — HIGH (ref 3.8–10.5)
WBC # FLD AUTO: 1.6 K/UL
WBC # FLD AUTO: 12.73 K/UL — HIGH (ref 3.8–10.5)

## 2020-04-20 ENCOUNTER — RESULT REVIEW (OUTPATIENT)
Age: 70
End: 2020-04-20

## 2020-04-20 ENCOUNTER — APPOINTMENT (OUTPATIENT)
Dept: HEMATOLOGY ONCOLOGY | Facility: CLINIC | Age: 70
End: 2020-04-20

## 2020-04-20 LAB
BASOPHILS # BLD AUTO: 0.01 K/UL — SIGNIFICANT CHANGE UP (ref 0–0.2)
BASOPHILS NFR BLD AUTO: 0.3 % — SIGNIFICANT CHANGE UP (ref 0–2)
EOSINOPHIL # BLD AUTO: 0.04 K/UL — SIGNIFICANT CHANGE UP (ref 0–0.5)
EOSINOPHIL NFR BLD AUTO: 1 % — SIGNIFICANT CHANGE UP (ref 0–6)
HCT VFR BLD CALC: 35 % — SIGNIFICANT CHANGE UP (ref 34.5–45)
HGB BLD-MCNC: 11 G/DL — LOW (ref 11.5–15.5)
IMM GRANULOCYTES NFR BLD AUTO: 1.3 % — SIGNIFICANT CHANGE UP (ref 0–1.5)
LYMPHOCYTES # BLD AUTO: 0.47 K/UL — LOW (ref 1–3.3)
LYMPHOCYTES # BLD AUTO: 11.9 % — LOW (ref 13–44)
MCHC RBC-ENTMCNC: 29.3 PG — SIGNIFICANT CHANGE UP (ref 27–34)
MCHC RBC-ENTMCNC: 31.4 GM/DL — LOW (ref 32–36)
MCV RBC AUTO: 93.3 FL — SIGNIFICANT CHANGE UP (ref 80–100)
MONOCYTES # BLD AUTO: 0.5 K/UL — SIGNIFICANT CHANGE UP (ref 0–0.9)
MONOCYTES NFR BLD AUTO: 12.7 % — SIGNIFICANT CHANGE UP (ref 2–14)
NEUTROPHILS # BLD AUTO: 2.88 K/UL — SIGNIFICANT CHANGE UP (ref 1.8–7.4)
NEUTROPHILS NFR BLD AUTO: 72.8 % — SIGNIFICANT CHANGE UP (ref 43–77)
NRBC # BLD: 0 /100 WBCS — SIGNIFICANT CHANGE UP (ref 0–0)
PLATELET # BLD AUTO: 175 K/UL — SIGNIFICANT CHANGE UP (ref 150–400)
RBC # BLD: 3.75 M/UL — LOW (ref 3.8–5.2)
RBC # FLD: 14.3 % — SIGNIFICANT CHANGE UP (ref 10.3–14.5)
WBC # BLD: 3.95 K/UL — SIGNIFICANT CHANGE UP (ref 3.8–10.5)
WBC # FLD AUTO: 3.95 K/UL — SIGNIFICANT CHANGE UP (ref 3.8–10.5)

## 2020-04-22 VITALS — BODY MASS INDEX: 24.51 KG/M2 | WEIGHT: 121.36 LBS

## 2020-04-22 PROCEDURE — 77387B: CUSTOM | Mod: 26

## 2020-04-22 PROCEDURE — 77427 RADIATION TX MANAGEMENT X5: CPT

## 2020-04-22 NOTE — REVIEW OF SYSTEMS
[Constipation: Grade 0] : Constipation: Grade 0 [Diarrhea: Grade 0] : Diarrhea: Grade 0 [Urinary Incontinence: Grade 0] : Urinary Incontinence: Grade 0  [Urinary Tract Pain: Grade 0] : Urinary Tract Pain: Grade 0 [Urinary Retention: Grade 0] : Urinary Retention: Grade 0 [Urinary Frequency: Grade 0] : Urinary Frequency: Grade 0 [Urinary Urgency: Grade 0] : Urinary Urgency: Grade 0 [Fatigue: Grade 1 - Fatigue relieved by rest] : Fatigue: Grade 1 - Fatigue relieved by rest

## 2020-04-22 NOTE — ASSESSMENT
[Curative] : Goals of care discussed with patient: Curative [FreeTextEntry1] : 68 y/o F with recently diagnosed stage IIIC [id number] grade 1 (well differentiated) endometrial carcinoma involving b/l pelvic nodes, s/p PITO/BSO and pelvic LN dissection, currently undergoing adjuvant carbo/taxol chemotherapy. \par In discussion with Dr. Wright of radiation oncology, plan is for "sandwich" therapy strategy with chemotherapy and radiation therapy. \par \par S/p C3 carbo/taxol. Patient continues to tolerat chemo well without significant toxicities. \par Neulasta (onpro) added with last cycle for neutropenia.\par \par -bloodwork today\par -patient scheduled for radiation simulation on 2/18 to start pelvic RT\par -after completion of radiation with Dr. Wright, patient will resume 3 further cycles of chemotherapy to complete 6 treatments. \par \par All of the patient and her daughter's questions and concerns were addressed in full. They know to call my office should there be any new concerns or symptoms. \par \par

## 2020-04-22 NOTE — HISTORY OF PRESENT ILLNESS
[T: ___] : T[unfilled] [Disease: _____________________] : Disease: [unfilled] [N: ___] : N[unfilled] [M: ___] : M[unfilled] [de-identified] : Ms. Barakat is a 68 y/o post-menopausal F who saw Dr. Andrade for an annual gyn exam in 2019. She reports feeling well without any symptoms, including vaginal discharge or bleeding at that time. TVUS during this visit revealed a thickened endometrium and underwent D&C hysteroscopy with polypectomy on 19. Pathology (reviewed at [facility]) showed fragments of markedly atypical glandular epithelium, highly suspicious for adenocarcinoma. \par \par CT A/P demonstrated a large endometrial mass, probably posterior fibroid and R ovarian cyst. No evidence of metastatic disease was seen.\par \par She was seen by Dr. Terrazas and underwent PITO/BSO on 10/1/19. Surgical pathology revealed well differentiated adenocarcinoma arising in the lower uterine segment, favor entometrioid, grade 1, involving the cervical stroma and 99% myometrium. +LVI, parametrial invasion+ and no lymph nodes were submitted. It was noted that the tumor was multi-focally deeply myoinvasive within the lower uterine segment; appears to undermine endometrium. \par \par Patient subsequently underwent surgical staging on 19 with b/l pelvis LND and pathology revealed 1/6 nodes on the L and 1/2 nodes on the R to be positive for metastatic carcinoma. No perinodal extension was present. \par \par She has recovered from her recent surgeries without any issues and feels well overall today. She was referred to medical oncology and radiation oncology for adjuvant therapy options. She denies fevers, chills, abdominal pain, vaginal bleeding/discharge, CP, SOB, weight loss, urinary symptoms, edema or any other new symptoms. 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  \par Breast biopsy in 2018 with intraductal papilloma, excision of left breast mass with NL in 10/2018 pathology with fibrocystic changes with intraductal papillary hyperplasia.  \par  [AJCC Stage: ____] : AJCC Stage: [unfilled] [de-identified] : adenocarcinoma  [FreeTextEntry1] : Carbo/taxol x 3 cycles --> EBRT  [de-identified] : Patient presents with her daughter today, following C3 carbo/taxol. She continues to feel very well with excellent appetite and energy. Neulasta was added with the most recent cycle due to chemo-associated neutropenia. She has been able to maintain all of her daily activities and denies n/v, f/c, abdominal pain, urinary symptoms, changes in bowel habits or vaginal bleeding/discharge. All other ROS reviewed and negative in detail.

## 2020-04-23 PROCEDURE — 77387B: CUSTOM | Mod: 26

## 2020-04-24 PROCEDURE — 77387B: CUSTOM | Mod: 26

## 2020-04-27 PROCEDURE — 77014: CPT | Mod: 26

## 2020-04-28 VITALS
SYSTOLIC BLOOD PRESSURE: 122 MMHG | BODY MASS INDEX: 24 KG/M2 | RESPIRATION RATE: 18 BRPM | WEIGHT: 118.83 LBS | OXYGEN SATURATION: 98 % | HEART RATE: 72 BPM | DIASTOLIC BLOOD PRESSURE: 80 MMHG

## 2020-04-28 PROCEDURE — 77334 RADIATION TREATMENT AID(S): CPT | Mod: 26

## 2020-04-28 PROCEDURE — 77317 BRACHYTX ISODOSE INTERMED: CPT | Mod: 26

## 2020-04-28 PROCEDURE — 77387B: CUSTOM | Mod: 26

## 2020-04-28 PROCEDURE — 77290 THER RAD SIMULAJ FIELD CPLX: CPT | Mod: 26

## 2020-04-28 NOTE — VITALS
[Least Pain Intensity: 0/10] : 0/10 [90: Able to carry normal activity; minor signs or symptoms of disease.] : 90: Able to carry normal activity; minor signs or symptoms of disease.  [Maximal Pain Intensity: 0/10] : 0/10 [ECOG Performance Status: 0 - Fully active, able to carry on all pre-disease performance without restriction] : Performance Status: 0 - Fully active, able to carry on all pre-disease performance without restriction

## 2020-04-28 NOTE — REVIEW OF SYSTEMS
[Diarrhea: Grade 0] : Diarrhea: Grade 0 [Constipation: Grade 0] : Constipation: Grade 0 [Urinary Incontinence: Grade 0] : Urinary Incontinence: Grade 0  [Fatigue: Grade 1 - Fatigue relieved by rest] : Fatigue: Grade 1 - Fatigue relieved by rest [Urinary Tract Pain: Grade 0] : Urinary Tract Pain: Grade 0 [Urinary Retention: Grade 0] : Urinary Retention: Grade 0 [Urinary Urgency: Grade 0] : Urinary Urgency: Grade 0 [Urinary Frequency: Grade 0] : Urinary Frequency: Grade 0

## 2020-04-29 LAB
BASOPHILS # BLD AUTO: 0.01 K/UL
BASOPHILS NFR BLD AUTO: 0.6 %
EOSINOPHIL # BLD AUTO: 0.01 K/UL
EOSINOPHIL NFR BLD AUTO: 0.6 %
HCT VFR BLD CALC: 33.5 %
HGB BLD-MCNC: 10.5 G/DL
IMM GRANULOCYTES NFR BLD AUTO: 0 %
LYMPHOCYTES # BLD AUTO: 0.2 K/UL
LYMPHOCYTES NFR BLD AUTO: 11.8 %
MAN DIFF?: NORMAL
MCHC RBC-ENTMCNC: 29.3 PG
MCHC RBC-ENTMCNC: 31.3 GM/DL
MCV RBC AUTO: 93.6 FL
MONOCYTES # BLD AUTO: 0.28 K/UL
MONOCYTES NFR BLD AUTO: 16.6 %
NEUTROPHILS # BLD AUTO: 1.19 K/UL
NEUTROPHILS NFR BLD AUTO: 70.4 %
PLATELET # BLD AUTO: 192 K/UL
RBC # BLD: 3.58 M/UL
RBC # FLD: 13.7 %
WBC # FLD AUTO: 1.69 K/UL

## 2020-04-29 PROCEDURE — 77387B: CUSTOM | Mod: 26

## 2020-04-30 PROCEDURE — 77387B: CUSTOM | Mod: 26

## 2020-05-01 ENCOUNTER — LABORATORY RESULT (OUTPATIENT)
Age: 70
End: 2020-05-01

## 2020-05-01 LAB
BASOPHILS # BLD AUTO: 0 K/UL
BASOPHILS NFR BLD AUTO: 0 %
EOSINOPHIL # BLD AUTO: 0.01 K/UL
EOSINOPHIL NFR BLD AUTO: 0.8 %
HCT VFR BLD CALC: 35 %
HGB BLD-MCNC: 10.8 G/DL
LYMPHOCYTES # BLD AUTO: 0.15 K/UL
LYMPHOCYTES NFR BLD AUTO: 11.3 %
MAGNESIUM SERPL-MCNC: 1.3 MG/DL
MAN DIFF?: NORMAL
MCHC RBC-ENTMCNC: 29.2 PG
MCHC RBC-ENTMCNC: 30.9 GM/DL
MCV RBC AUTO: 94.6 FL
MONOCYTES # BLD AUTO: 0.2 K/UL
MONOCYTES NFR BLD AUTO: 14.8 %
NEUTROPHILS # BLD AUTO: 0.95 K/UL
NEUTROPHILS NFR BLD AUTO: 72.2 %
PLATELET # BLD AUTO: 207 K/UL
RBC # BLD: 3.7 M/UL
RBC # FLD: 13.7 %
WBC # FLD AUTO: 1.32 K/UL

## 2020-05-01 PROCEDURE — 77332 RADIATION TREATMENT AID(S): CPT | Mod: 26

## 2020-05-01 PROCEDURE — 77770 HDR RDNCL NTRSTL/ICAV BRCHTX: CPT | Mod: 26

## 2020-05-01 PROCEDURE — 57156 INS VAG BRACHYTX DEVICE: CPT

## 2020-05-04 DIAGNOSIS — D72.819 DECREASED WHITE BLOOD CELL COUNT, UNSPECIFIED: ICD-10-CM

## 2020-05-04 PROCEDURE — 57156 INS VAG BRACHYTX DEVICE: CPT

## 2020-05-04 PROCEDURE — 77770 HDR RDNCL NTRSTL/ICAV BRCHTX: CPT | Mod: 26

## 2020-05-04 PROCEDURE — 77332 RADIATION TREATMENT AID(S): CPT | Mod: 26

## 2020-05-05 LAB
BASOPHILS # BLD AUTO: 0.01 K/UL
BASOPHILS NFR BLD AUTO: 0.8 %
EOSINOPHIL # BLD AUTO: 0.01 K/UL
EOSINOPHIL NFR BLD AUTO: 0.8 %
HCT VFR BLD CALC: 35 %
HGB BLD-MCNC: 10.7 G/DL
IMM GRANULOCYTES NFR BLD AUTO: 0 %
LYMPHOCYTES # BLD AUTO: 0.24 K/UL
LYMPHOCYTES NFR BLD AUTO: 19.7 %
MAN DIFF?: NORMAL
MCHC RBC-ENTMCNC: 29.1 PG
MCHC RBC-ENTMCNC: 30.6 GM/DL
MCV RBC AUTO: 95.1 FL
MONOCYTES # BLD AUTO: 0.21 K/UL
MONOCYTES NFR BLD AUTO: 17.2 %
NEUTROPHILS # BLD AUTO: 0.75 K/UL
NEUTROPHILS NFR BLD AUTO: 61.5 %
PLATELET # BLD AUTO: 194 K/UL
RBC # BLD: 3.68 M/UL
RBC # FLD: 13.8 %
WBC # FLD AUTO: 1.22 K/UL

## 2020-05-05 NOTE — DISEASE MANAGEMENT
[Pathological] : TNM Stage: p [IIIC] : IIIC [TTNM] : 3b [NTNM] : 1 [MTNM] : 0 [de-identified] : 0991 [de-identified] : 6598 [de-identified] : pelvis

## 2020-05-05 NOTE — HISTORY OF PRESENT ILLNESS
[FreeTextEntry1] : Ms. Barakat is a 70 yo female with Stage IIIC [id number] grade 1 endometrial carcinoma s/p PITO/BSO and bilateral pelvic LND. She is s/p 3 cycles of chemotherapy.\par \par Restarted treatment s/p low ANC treated with xarzio with incease in WBC to over 3.. Reports fatigue. No pain. Appetite improved. No urinary complaints. Had diarrhea which subsided with Imodiium. Moving bowels normally Diarrhea has resolved. She is moving bowels daily.

## 2020-05-05 NOTE — HISTORY OF PRESENT ILLNESS
[FreeTextEntry1] : Ms. Barakat completed treatment today, including external beam pelvic radiation and vaginal HDRx2.\par We have been following her CBC. She was on a short breakd forcounts and received support. Her WBC improved and she restarted treatment. We rechecked counts on  4/28 with wbc 1.69  and anc 1.19.  We repeated on 5/1 with wbc 1.32 and anc .95.\par Discussed with Dr. Leiva and we opted to observe and recheck.\par CBC sent today.

## 2020-05-05 NOTE — ADDENDUM
[FreeTextEntry1] : ANC .75.\par MS mcfarlane is asymtomatic.\par .will discuss again with Dr. Leiva.

## 2020-05-05 NOTE — DISEASE MANAGEMENT
[Pathological] : TNM Stage: p [IIIC] : IIIC [TTNM] : 3b [NTNM] : 1 [MTNM] : 0 [de-identified] : 7006 [de-identified] : 9152 [de-identified] : pelvis

## 2020-05-05 NOTE — HISTORY OF PRESENT ILLNESS
[FreeTextEntry1] : Ms. Barakat is a 70 yo female with Stage IIIC [id number] grade 1 endometrial carcinoma s/p PITO/BSO and bilateral pelvic LND. She is s/p 3 cycles of chemotherapy.\par \par Restarted treatment s/p low ANC. Reports fatigue. No pain. Appetite improved. No urinary complaints. Had diarrhea which subsided with Imodiium. Moving bowels normally Diarrhea has resolved. She is moving bowels daily. \par 4/28: Feeling well No new issues\par Plan for HDR1 on Friday.

## 2020-05-05 NOTE — DISEASE MANAGEMENT
[Pathological] : TNM Stage: p [IIIC] : IIIC [TTNM] : 3b [NTNM] : 1 [MTNM] : 0 [de-identified] : 8999 [de-identified] : 5224 [de-identified] : pelvis

## 2020-05-13 ENCOUNTER — OUTPATIENT (OUTPATIENT)
Dept: OUTPATIENT SERVICES | Facility: HOSPITAL | Age: 70
LOS: 1 days | Discharge: HOME | End: 2020-05-13

## 2020-05-13 ENCOUNTER — LABORATORY RESULT (OUTPATIENT)
Age: 70
End: 2020-05-13

## 2020-05-13 DIAGNOSIS — Z87.39 PERSONAL HISTORY OF OTHER DISEASES OF THE MUSCULOSKELETAL SYSTEM AND CONNECTIVE TISSUE: Chronic | ICD-10-CM

## 2020-05-13 DIAGNOSIS — Z90.710 ACQUIRED ABSENCE OF BOTH CERVIX AND UTERUS: Chronic | ICD-10-CM

## 2020-05-13 DIAGNOSIS — Z98.890 OTHER SPECIFIED POSTPROCEDURAL STATES: Chronic | ICD-10-CM

## 2020-05-13 DIAGNOSIS — N60.12 DIFFUSE CYSTIC MASTOPATHY OF LEFT BREAST: Chronic | ICD-10-CM

## 2020-05-13 DIAGNOSIS — C54.1 MALIGNANT NEOPLASM OF ENDOMETRIUM: ICD-10-CM

## 2020-05-14 LAB
ALBUMIN SERPL ELPH-MCNC: 4.1 G/DL
ALP BLD-CCNC: 78 U/L
ALT SERPL-CCNC: 9 U/L
ANION GAP SERPL CALC-SCNC: 15 MMOL/L
AST SERPL-CCNC: 20 U/L
BASOPHILS # BLD AUTO: 0.01 K/UL
BASOPHILS NFR BLD AUTO: 0.6 %
BILIRUB SERPL-MCNC: 0.4 MG/DL
BUN SERPL-MCNC: 14 MG/DL
CALCIUM SERPL-MCNC: 9.9 MG/DL
CHLORIDE SERPL-SCNC: 102 MMOL/L
CO2 SERPL-SCNC: 26 MMOL/L
CREAT SERPL-MCNC: 0.68 MG/DL
EOSINOPHIL # BLD AUTO: 0.02 K/UL
EOSINOPHIL NFR BLD AUTO: 1.2 %
GLUCOSE SERPL-MCNC: 90 MG/DL
HCT VFR BLD CALC: 36.4 %
HGB BLD-MCNC: 11.2 G/DL
IMM GRANULOCYTES NFR BLD AUTO: 0 %
LYMPHOCYTES # BLD AUTO: 0.35 K/UL
LYMPHOCYTES NFR BLD AUTO: 20.8 %
MAN DIFF?: NORMAL
MCHC RBC-ENTMCNC: 28.8 PG
MCHC RBC-ENTMCNC: 30.8 GM/DL
MCV RBC AUTO: 93.6 FL
MONOCYTES # BLD AUTO: 0.42 K/UL
MONOCYTES NFR BLD AUTO: 25 %
NEUTROPHILS # BLD AUTO: 0.88 K/UL
NEUTROPHILS NFR BLD AUTO: 52.4 %
PLATELET # BLD AUTO: 207 K/UL
POTASSIUM SERPL-SCNC: 4.3 MMOL/L
PROT SERPL-MCNC: 7.3 G/DL
RBC # BLD: 3.89 M/UL
RBC # FLD: 13.6 %
SODIUM SERPL-SCNC: 143 MMOL/L
WBC # FLD AUTO: 1.68 K/UL

## 2020-05-18 ENCOUNTER — APPOINTMENT (OUTPATIENT)
Dept: HEMATOLOGY ONCOLOGY | Facility: CLINIC | Age: 70
End: 2020-05-18
Payer: MEDICARE

## 2020-05-18 PROCEDURE — 99214 OFFICE O/P EST MOD 30 MIN: CPT | Mod: 95

## 2020-05-18 NOTE — ASSESSMENT
[Curative] : Goals of care discussed with patient: Curative [FreeTextEntry1] : 68 y/o F with recently diagnosed stage IIIC [id number] grade 1 (well differentiated) endometrial carcinoma involving b/l pelvic nodes, s/p PITO/BSO and pelvic LN dissection, currently undergoing adjuvant sandwich chemotherapy-RT therapy. \par \par Patient is s/p C3 carbo/taxol completed 1/31/20.\par She has completed EBRT directed to the pelvic nodes with VBT boost with Dr. Wright on 5/14. \par \par We discussed resuming chemotherapy to complete 3 additional cycles of carbo/taxol. We again reviewed the potential benefits and risks, as well as the possible toxicities and side effects of chemotherapy.\par \par Patient has been neutropenic over the past 2-3 weeks requiring zarxio during RT therapy, and may require additional growth factor support before resuming chemotherapy.\par \par Plan:\par -check bloodwork this week and may need Zarxio if still neutropenic\par -tentatively plan to restart chemotherapy in 1 week\par -will add onpro given recent neutropenia  \par \par All of the patient's questions and concerns were addressed in full. They know to call my office should there be any new concerns or symptoms. \par \par

## 2020-05-18 NOTE — HISTORY OF PRESENT ILLNESS
[Disease: _____________________] : Disease: [unfilled] [T: ___] : T[unfilled] [N: ___] : N[unfilled] [M: ___] : M[unfilled] [AJCC Stage: ____] : AJCC Stage: [unfilled] [Home] : at home, [unfilled] , at the time of the visit. [Family Member] : family member [Medical Office: ([facility])___] : at the medical office located in  [de-identified] : Ms. Barakat is a 68 y/o post-menopausal F who saw Dr. Andrade for an annual gyn exam in 2019. She reports feeling well without any symptoms, including vaginal discharge or bleeding at that time. TVUS during this visit revealed a thickened endometrium and underwent D&C hysteroscopy with polypectomy on 19. Pathology (reviewed at [facility]) showed fragments of markedly atypical glandular epithelium, highly suspicious for adenocarcinoma. \par \par CT A/P demonstrated a large endometrial mass, probably posterior fibroid and R ovarian cyst. No evidence of metastatic disease was seen.\par \par She was seen by Dr. Terrazas and underwent PITO/BSO on 10/1/19. Surgical pathology revealed well differentiated adenocarcinoma arising in the lower uterine segment, favor entometrioid, grade 1, involving the cervical stroma and 99% myometrium. +LVI, parametrial invasion+ and no lymph nodes were submitted. It was noted that the tumor was multi-focally deeply myoinvasive within the lower uterine segment; appears to undermine endometrium. \par \par Patient subsequently underwent surgical staging on 19 with b/l pelvis LND and pathology revealed 1/6 nodes on the L and 1/2 nodes on the R to be positive for metastatic carcinoma. No perinodal extension was present. \par \par She has recovered from her recent surgeries without any issues and feels well overall today. She was referred to medical oncology and radiation oncology for adjuvant therapy options. She denies fevers, chills, abdominal pain, vaginal bleeding/discharge, CP, SOB, weight loss, urinary symptoms, edema or any other new symptoms. 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  \par Breast biopsy in 2018 with intraductal papilloma, excision of left breast mass with NL in 10/2018 pathology with fibrocystic changes with intraductal papillary hyperplasia.  \par  [de-identified] : adenocarcinoma  [FreeTextEntry1] : Carbo/taxol x 3 cycles --> EBRT  [de-identified] : Patient presents via telehealth visit today, accompanied by her daughter. Since completing 3 cycles of carbo/taxol on 1/31/20, she has been followed by Dr. Wright for the past 3 months for EBRT and VBT. She has completed her RT treatment on 5/14/20 and presents for follow up for consideration of further chemotherapy per the sandwich chemo-RT protocol. She reports feeling well overall, and feels that she has tolerated RT well, which was c/b some diarrhea and vaginal pruritis. In addition, she has been neutropenic during the past 2-3 weeks requiring zarxio support. Currently she has no complaints and has a good appetite and energy. She denies fevers, chills, n/v, f/c, abdominal pain, urinary symptoms, changes in bowel habits or vaginal bleeding/discharge. All other ROS reviewed and negative in detail.

## 2020-05-18 NOTE — PHYSICAL EXAM
[Fully active, able to carry on all pre-disease performance without restriction] : Status 0 - Fully active, able to carry on all pre-disease performance without restriction [Normal] : No focal neurologic defects observed [de-identified] : Normal respiratory effort and regular breaths

## 2020-05-20 ENCOUNTER — LABORATORY RESULT (OUTPATIENT)
Age: 70
End: 2020-05-20

## 2020-05-21 ENCOUNTER — LABORATORY RESULT (OUTPATIENT)
Age: 70
End: 2020-05-21

## 2020-05-21 ENCOUNTER — APPOINTMENT (OUTPATIENT)
Dept: INFUSION THERAPY | Facility: HOSPITAL | Age: 70
End: 2020-05-21

## 2020-05-21 ENCOUNTER — RESULT REVIEW (OUTPATIENT)
Age: 70
End: 2020-05-21

## 2020-05-21 ENCOUNTER — APPOINTMENT (OUTPATIENT)
Dept: HEMATOLOGY ONCOLOGY | Facility: CLINIC | Age: 70
End: 2020-05-21

## 2020-05-21 LAB
BASOPHILS # BLD AUTO: 0.01 K/UL — SIGNIFICANT CHANGE UP (ref 0–0.2)
BASOPHILS NFR BLD AUTO: 0.4 % — SIGNIFICANT CHANGE UP (ref 0–2)
EOSINOPHIL # BLD AUTO: 0.01 K/UL — SIGNIFICANT CHANGE UP (ref 0–0.5)
EOSINOPHIL NFR BLD AUTO: 0.4 % — SIGNIFICANT CHANGE UP (ref 0–6)
HCT VFR BLD CALC: 32.4 % — LOW (ref 34.5–45)
HGB BLD-MCNC: 10 G/DL — LOW (ref 11.5–15.5)
IMM GRANULOCYTES NFR BLD AUTO: 0.4 % — SIGNIFICANT CHANGE UP (ref 0–1.5)
LYMPHOCYTES # BLD AUTO: 0.27 K/UL — LOW (ref 1–3.3)
LYMPHOCYTES # BLD AUTO: 11 % — LOW (ref 13–44)
MCHC RBC-ENTMCNC: 29.1 PG — SIGNIFICANT CHANGE UP (ref 27–34)
MCHC RBC-ENTMCNC: 30.9 GM/DL — LOW (ref 32–36)
MCV RBC AUTO: 94.2 FL — SIGNIFICANT CHANGE UP (ref 80–100)
MONOCYTES # BLD AUTO: 0.39 K/UL — SIGNIFICANT CHANGE UP (ref 0–0.9)
MONOCYTES NFR BLD AUTO: 15.9 % — HIGH (ref 2–14)
NEUTROPHILS # BLD AUTO: 1.76 K/UL — LOW (ref 1.8–7.4)
NEUTROPHILS NFR BLD AUTO: 71.9 % — SIGNIFICANT CHANGE UP (ref 43–77)
NRBC # BLD: 0 /100 WBCS — SIGNIFICANT CHANGE UP (ref 0–0)
PLATELET # BLD AUTO: 178 K/UL — SIGNIFICANT CHANGE UP (ref 150–400)
RBC # BLD: 3.44 M/UL — LOW (ref 3.8–5.2)
RBC # FLD: 13.6 % — SIGNIFICANT CHANGE UP (ref 10.3–14.5)
WBC # BLD: 2.45 K/UL — LOW (ref 3.8–10.5)
WBC # FLD AUTO: 2.45 K/UL — LOW (ref 3.8–10.5)

## 2020-05-29 ENCOUNTER — RESULT REVIEW (OUTPATIENT)
Age: 70
End: 2020-05-29

## 2020-05-29 ENCOUNTER — APPOINTMENT (OUTPATIENT)
Dept: INFUSION THERAPY | Facility: HOSPITAL | Age: 70
End: 2020-05-29

## 2020-05-29 LAB
BASOPHILS # BLD AUTO: 0 K/UL — SIGNIFICANT CHANGE UP (ref 0–0.2)
BASOPHILS NFR BLD AUTO: 0 % — SIGNIFICANT CHANGE UP (ref 0–2)
EOSINOPHIL # BLD AUTO: 0.02 K/UL — SIGNIFICANT CHANGE UP (ref 0–0.5)
EOSINOPHIL NFR BLD AUTO: 1 % — SIGNIFICANT CHANGE UP (ref 0–6)
HCT VFR BLD CALC: 34.4 % — LOW (ref 34.5–45)
HGB BLD-MCNC: 10.9 G/DL — LOW (ref 11.5–15.5)
LYMPHOCYTES # BLD AUTO: 0.59 K/UL — LOW (ref 1–3.3)
LYMPHOCYTES # BLD AUTO: 26 % — SIGNIFICANT CHANGE UP (ref 13–44)
MCHC RBC-ENTMCNC: 29 PG — SIGNIFICANT CHANGE UP (ref 27–34)
MCHC RBC-ENTMCNC: 31.7 GM/DL — LOW (ref 32–36)
MCV RBC AUTO: 91.5 FL — SIGNIFICANT CHANGE UP (ref 80–100)
MONOCYTES # BLD AUTO: 0.25 K/UL — SIGNIFICANT CHANGE UP (ref 0–0.9)
MONOCYTES NFR BLD AUTO: 11 % — SIGNIFICANT CHANGE UP (ref 2–14)
NEUTROPHILS # BLD AUTO: 1.41 K/UL — LOW (ref 1.8–7.4)
NEUTROPHILS NFR BLD AUTO: 62 % — SIGNIFICANT CHANGE UP (ref 43–77)
NRBC # BLD: 0 /100 — SIGNIFICANT CHANGE UP (ref 0–0)
NRBC # BLD: SIGNIFICANT CHANGE UP /100 WBCS (ref 0–0)
PLAT MORPH BLD: NORMAL — SIGNIFICANT CHANGE UP
PLATELET # BLD AUTO: 180 K/UL — SIGNIFICANT CHANGE UP (ref 150–400)
RBC # BLD: 3.76 M/UL — LOW (ref 3.8–5.2)
RBC # FLD: 13.7 % — SIGNIFICANT CHANGE UP (ref 10.3–14.5)
RBC BLD AUTO: SIGNIFICANT CHANGE UP
WBC # BLD: 2.28 K/UL — LOW (ref 3.8–10.5)
WBC # FLD AUTO: 2.28 K/UL — LOW (ref 3.8–10.5)

## 2020-06-01 DIAGNOSIS — R11.2 NAUSEA WITH VOMITING, UNSPECIFIED: ICD-10-CM

## 2020-06-01 DIAGNOSIS — Z51.89 ENCOUNTER FOR OTHER SPECIFIED AFTERCARE: ICD-10-CM

## 2020-06-01 DIAGNOSIS — Z51.11 ENCOUNTER FOR ANTINEOPLASTIC CHEMOTHERAPY: ICD-10-CM

## 2020-06-03 ENCOUNTER — APPOINTMENT (OUTPATIENT)
Dept: RADIATION ONCOLOGY | Facility: CLINIC | Age: 70
End: 2020-06-03
Payer: MEDICARE

## 2020-06-03 PROCEDURE — 99024 POSTOP FOLLOW-UP VISIT: CPT

## 2020-06-09 NOTE — REVIEW OF SYSTEMS
[Constipation: Grade 0] : Constipation: Grade 0 [Diarrhea: Grade 0] : Diarrhea: Grade 0 [Fatigue: Grade 0] : Fatigue: Grade 0 [Urinary Incontinence: Grade 0] : Urinary Incontinence: Grade 0  [Urinary Retention: Grade 0] : Urinary Retention: Grade 0 [Urinary Tract Pain: Grade 0] : Urinary Tract Pain: Grade 0 [Urinary Urgency: Grade 0] : Urinary Urgency: Grade 0 [Urinary Frequency: Grade 0] : Urinary Frequency: Grade 0 [de-identified] : growth factor used to support wbc for chemotherapy

## 2020-06-09 NOTE — HISTORY OF PRESENT ILLNESS
----- Message from Rashard Taylor MD sent at 2/23/2020  6:47 PM CST -----  Please let patient know he has a UTI.  I sent Bactrim x 10 days to the [facility] main [facility] pharmacy.  Please remind him to /start.  Thanks!   [Home] : at home, [unfilled] , at the time of the visit. [Medical Office: ([facility])___] : at the medical office located in  [FreeTextEntry1] : Cailin Barakat is a 70 year old female with stage IIIC endometrial carcinomas/p PITO/BSO and bilateral pelvic LND s/p 3 cycles of chemotherapy. She completed EBRT and vaginal brachytherapy. Treatment was completed on 5/4/2020. She tolerated her radiation well. She was on a short break due to asymptomatic neutropenia toward the end of her treatment. She otherwise did not develop any significant toxicity as a result of the treatment. \par \par She returns today for a post treatment evaluation. She is feeling well. Denies any pain. No fatigue. No urinary or bowel complaints. No vaginal bleeding or discharge. Started on chemotherapy and tolerating. growth factor being used. Follow up with Dr. Terrazas later this month.

## 2020-06-11 ENCOUNTER — APPOINTMENT (OUTPATIENT)
Dept: INFUSION THERAPY | Facility: HOSPITAL | Age: 70
End: 2020-06-11

## 2020-06-11 ENCOUNTER — RESULT REVIEW (OUTPATIENT)
Age: 70
End: 2020-06-11

## 2020-06-11 ENCOUNTER — APPOINTMENT (OUTPATIENT)
Dept: GYNECOLOGIC ONCOLOGY | Facility: CLINIC | Age: 70
End: 2020-06-11
Payer: MEDICARE

## 2020-06-11 ENCOUNTER — LABORATORY RESULT (OUTPATIENT)
Age: 70
End: 2020-06-11

## 2020-06-11 ENCOUNTER — APPOINTMENT (OUTPATIENT)
Dept: HEMATOLOGY ONCOLOGY | Facility: CLINIC | Age: 70
End: 2020-06-11
Payer: MEDICARE

## 2020-06-11 VITALS
WEIGHT: 114.64 LBS | BODY MASS INDEX: 23.15 KG/M2 | DIASTOLIC BLOOD PRESSURE: 83 MMHG | SYSTOLIC BLOOD PRESSURE: 127 MMHG | OXYGEN SATURATION: 98 % | HEART RATE: 72 BPM | TEMPERATURE: 98.9 F | RESPIRATION RATE: 18 BRPM

## 2020-06-11 VITALS
BODY MASS INDEX: 23.39 KG/M2 | HEIGHT: 59 IN | WEIGHT: 116 LBS | DIASTOLIC BLOOD PRESSURE: 82 MMHG | HEART RATE: 78 BPM | SYSTOLIC BLOOD PRESSURE: 137 MMHG

## 2020-06-11 LAB
BASOPHILS # BLD AUTO: 0.07 K/UL — SIGNIFICANT CHANGE UP (ref 0–0.2)
BASOPHILS NFR BLD AUTO: 0.8 % — SIGNIFICANT CHANGE UP (ref 0–2)
EOSINOPHIL # BLD AUTO: 0 K/UL — SIGNIFICANT CHANGE UP (ref 0–0.5)
EOSINOPHIL NFR BLD AUTO: 0 % — SIGNIFICANT CHANGE UP (ref 0–6)
HCT VFR BLD CALC: 31.2 % — LOW (ref 34.5–45)
HGB BLD-MCNC: 9.8 G/DL — LOW (ref 11.5–15.5)
IMM GRANULOCYTES NFR BLD AUTO: 2 % — HIGH (ref 0–1.5)
LYMPHOCYTES # BLD AUTO: 0.52 K/UL — LOW (ref 1–3.3)
LYMPHOCYTES # BLD AUTO: 5.8 % — LOW (ref 13–44)
MCHC RBC-ENTMCNC: 28.6 PG — SIGNIFICANT CHANGE UP (ref 27–34)
MCHC RBC-ENTMCNC: 31.4 GM/DL — LOW (ref 32–36)
MCV RBC AUTO: 91 FL — SIGNIFICANT CHANGE UP (ref 80–100)
MONOCYTES # BLD AUTO: 1.06 K/UL — HIGH (ref 0–0.9)
MONOCYTES NFR BLD AUTO: 11.8 % — SIGNIFICANT CHANGE UP (ref 2–14)
NEUTROPHILS # BLD AUTO: 7.13 K/UL — SIGNIFICANT CHANGE UP (ref 1.8–7.4)
NEUTROPHILS NFR BLD AUTO: 79.6 % — HIGH (ref 43–77)
NRBC # BLD: 0 /100 WBCS — SIGNIFICANT CHANGE UP (ref 0–0)
PLATELET # BLD AUTO: 92 K/UL — LOW (ref 150–400)
RBC # BLD: 3.43 M/UL — LOW (ref 3.8–5.2)
RBC # FLD: 14.6 % — HIGH (ref 10.3–14.5)
WBC # BLD: 8.96 K/UL — SIGNIFICANT CHANGE UP (ref 3.8–10.5)
WBC # FLD AUTO: 8.96 K/UL — SIGNIFICANT CHANGE UP (ref 3.8–10.5)

## 2020-06-11 PROCEDURE — 99213 OFFICE O/P EST LOW 20 MIN: CPT

## 2020-06-11 PROCEDURE — 99214 OFFICE O/P EST MOD 30 MIN: CPT

## 2020-06-12 RX ORDER — MAGNESIUM CHLORIDE 71.5 MG
71.5-119 TABLET, DELAYED RELEASE (ENTERIC COATED) ORAL
Qty: 28 | Refills: 0 | Status: ACTIVE | COMMUNITY
Start: 2020-06-12 | End: 1900-01-01

## 2020-06-15 ENCOUNTER — OUTPATIENT (OUTPATIENT)
Dept: OUTPATIENT SERVICES | Facility: HOSPITAL | Age: 70
LOS: 1 days | Discharge: HOME | End: 2020-06-15

## 2020-06-15 DIAGNOSIS — Z98.890 OTHER SPECIFIED POSTPROCEDURAL STATES: Chronic | ICD-10-CM

## 2020-06-15 DIAGNOSIS — N60.12 DIFFUSE CYSTIC MASTOPATHY OF LEFT BREAST: Chronic | ICD-10-CM

## 2020-06-15 DIAGNOSIS — Z90.710 ACQUIRED ABSENCE OF BOTH CERVIX AND UTERUS: Chronic | ICD-10-CM

## 2020-06-15 DIAGNOSIS — C54.1 MALIGNANT NEOPLASM OF ENDOMETRIUM: ICD-10-CM

## 2020-06-15 DIAGNOSIS — Z87.39 PERSONAL HISTORY OF OTHER DISEASES OF THE MUSCULOSKELETAL SYSTEM AND CONNECTIVE TISSUE: Chronic | ICD-10-CM

## 2020-06-19 ENCOUNTER — RESULT REVIEW (OUTPATIENT)
Age: 70
End: 2020-06-19

## 2020-06-19 ENCOUNTER — APPOINTMENT (OUTPATIENT)
Dept: INFUSION THERAPY | Facility: HOSPITAL | Age: 70
End: 2020-06-19

## 2020-06-19 LAB
BASOPHILS # BLD AUTO: 0.02 K/UL — SIGNIFICANT CHANGE UP (ref 0–0.2)
BASOPHILS NFR BLD AUTO: 0.5 % — SIGNIFICANT CHANGE UP (ref 0–2)
EOSINOPHIL # BLD AUTO: 0 K/UL — SIGNIFICANT CHANGE UP (ref 0–0.5)
EOSINOPHIL NFR BLD AUTO: 0 % — SIGNIFICANT CHANGE UP (ref 0–6)
HCT VFR BLD CALC: 32.6 % — LOW (ref 34.5–45)
HGB BLD-MCNC: 10.8 G/DL — LOW (ref 11.5–15.5)
IMM GRANULOCYTES NFR BLD AUTO: 0.7 % — SIGNIFICANT CHANGE UP (ref 0–1.5)
LYMPHOCYTES # BLD AUTO: 0.43 K/UL — LOW (ref 1–3.3)
LYMPHOCYTES # BLD AUTO: 9.8 % — LOW (ref 13–44)
MCHC RBC-ENTMCNC: 28.8 PG — SIGNIFICANT CHANGE UP (ref 27–34)
MCHC RBC-ENTMCNC: 33.1 GM/DL — SIGNIFICANT CHANGE UP (ref 32–36)
MCV RBC AUTO: 86.9 FL — SIGNIFICANT CHANGE UP (ref 80–100)
MONOCYTES # BLD AUTO: 0.87 K/UL — SIGNIFICANT CHANGE UP (ref 0–0.9)
MONOCYTES NFR BLD AUTO: 19.9 % — HIGH (ref 2–14)
NEUTROPHILS # BLD AUTO: 3.03 K/UL — SIGNIFICANT CHANGE UP (ref 1.8–7.4)
NEUTROPHILS NFR BLD AUTO: 69.1 % — SIGNIFICANT CHANGE UP (ref 43–77)
NRBC # BLD: 0 /100 WBCS — SIGNIFICANT CHANGE UP (ref 0–0)
PLATELET # BLD AUTO: 153 K/UL — SIGNIFICANT CHANGE UP (ref 150–400)
RBC # BLD: 3.75 M/UL — LOW (ref 3.8–5.2)
RBC # FLD: 15.5 % — HIGH (ref 10.3–14.5)
WBC # BLD: 4.38 K/UL — SIGNIFICANT CHANGE UP (ref 3.8–10.5)
WBC # FLD AUTO: 4.38 K/UL — SIGNIFICANT CHANGE UP (ref 3.8–10.5)

## 2020-06-22 DIAGNOSIS — R11.2 NAUSEA WITH VOMITING, UNSPECIFIED: ICD-10-CM

## 2020-06-22 DIAGNOSIS — Z51.11 ENCOUNTER FOR ANTINEOPLASTIC CHEMOTHERAPY: ICD-10-CM

## 2020-06-22 DIAGNOSIS — Z51.89 ENCOUNTER FOR OTHER SPECIFIED AFTERCARE: ICD-10-CM

## 2020-06-22 DIAGNOSIS — E86.0 DEHYDRATION: ICD-10-CM

## 2020-06-29 ENCOUNTER — APPOINTMENT (OUTPATIENT)
Dept: HEMATOLOGY ONCOLOGY | Facility: CLINIC | Age: 70
End: 2020-06-29
Payer: MEDICARE

## 2020-06-29 ENCOUNTER — APPOINTMENT (OUTPATIENT)
Dept: HEMATOLOGY ONCOLOGY | Facility: CLINIC | Age: 70
End: 2020-06-29

## 2020-06-29 PROCEDURE — 99214 OFFICE O/P EST MOD 30 MIN: CPT | Mod: 95

## 2020-06-29 NOTE — OB HISTORY
[Vaginal ___] : [unfilled] vaginal delivery(s) [Total Preg ___] : : [unfilled] [AB Spont ___] : [unfilled] miscarriage(s) [Definite ___ (Date)] : the last menstrual period was [unfilled] [Menarche Age ____] : age at menarche was [unfilled] [Menopause  Age ____] : menopause occurred at age [unfilled]

## 2020-07-01 ENCOUNTER — RESULT REVIEW (OUTPATIENT)
Age: 70
End: 2020-07-01

## 2020-07-01 ENCOUNTER — APPOINTMENT (OUTPATIENT)
Dept: INFUSION THERAPY | Facility: HOSPITAL | Age: 70
End: 2020-07-01

## 2020-07-01 ENCOUNTER — LABORATORY RESULT (OUTPATIENT)
Age: 70
End: 2020-07-01

## 2020-07-01 LAB
BASOPHILS # BLD AUTO: 0 K/UL — SIGNIFICANT CHANGE UP (ref 0–0.2)
BASOPHILS NFR BLD AUTO: 0 % — SIGNIFICANT CHANGE UP (ref 0–2)
EOSINOPHIL # BLD AUTO: 0 K/UL — SIGNIFICANT CHANGE UP (ref 0–0.5)
EOSINOPHIL NFR BLD AUTO: 0 % — SIGNIFICANT CHANGE UP (ref 0–6)
HCT VFR BLD CALC: 28.6 % — LOW (ref 34.5–45)
HGB BLD-MCNC: 9.2 G/DL — LOW (ref 11.5–15.5)
LYMPHOCYTES # BLD AUTO: 0.13 K/UL — LOW (ref 1–3.3)
LYMPHOCYTES # BLD AUTO: 1 % — LOW (ref 13–44)
MCHC RBC-ENTMCNC: 28.8 PG — SIGNIFICANT CHANGE UP (ref 27–34)
MCHC RBC-ENTMCNC: 32.2 GM/DL — SIGNIFICANT CHANGE UP (ref 32–36)
MCV RBC AUTO: 89.4 FL — SIGNIFICANT CHANGE UP (ref 80–100)
MONOCYTES # BLD AUTO: 1.56 K/UL — HIGH (ref 0–0.9)
MONOCYTES NFR BLD AUTO: 12 % — SIGNIFICANT CHANGE UP (ref 2–14)
MYELOCYTES NFR BLD: 1 % — HIGH (ref 0–0)
NEUTROPHILS # BLD AUTO: 11.17 K/UL — HIGH (ref 1.8–7.4)
NEUTROPHILS NFR BLD AUTO: 86 % — HIGH (ref 43–77)
NRBC # BLD: 0 /100 — SIGNIFICANT CHANGE UP (ref 0–0)
NRBC # BLD: SIGNIFICANT CHANGE UP /100 WBCS (ref 0–0)
PLAT MORPH BLD: NORMAL — SIGNIFICANT CHANGE UP
PLATELET # BLD AUTO: 107 K/UL — LOW (ref 150–400)
RBC # BLD: 3.2 M/UL — LOW (ref 3.8–5.2)
RBC # FLD: 15.7 % — HIGH (ref 10.3–14.5)
RBC BLD AUTO: SIGNIFICANT CHANGE UP
WBC # BLD: 12.99 K/UL — HIGH (ref 3.8–10.5)
WBC # FLD AUTO: 12.99 K/UL — HIGH (ref 3.8–10.5)

## 2020-07-03 NOTE — ASSESSMENT
[Curative] : Goals of care discussed with patient: Curative [FreeTextEntry1] : 68 y/o F with recently diagnosed stage IIIC [id number] grade 1 (well differentiated) endometrial carcinoma involving b/l pelvic nodes, s/p PITO/BSO and pelvic LN dissection, currently undergoing adjuvant sandwich chemotherapy-RT therapy. She has completed EBRT directed to the pelvic nodes with VBT boost with Dr. Wright on 5/14. \par \par Patient is s/p C4 carbo/taxol and tolerating well without any significant toxicities. \par \par Plan:\par -interval bloodwork and proceed with C5\par -onpro added given neutropenia  \par -f/u Dr. Wright rad onc for post-RT care \par -RTC 3 weeks\par \par All of the patient's questions and concerns were addressed in full. She knows to call my office should there be any new concerns or symptoms. \par \par

## 2020-07-03 NOTE — HISTORY OF PRESENT ILLNESS
[T: ___] : T[unfilled] [Disease: _____________________] : Disease: [unfilled] [N: ___] : N[unfilled] [M: ___] : M[unfilled] [AJCC Stage: ____] : AJCC Stage: [unfilled] [Home] : at home, [unfilled] , at the time of the visit. [Medical Office: ([facility])___] : at the medical office located in  [Family Member] : family member [de-identified] : Ms. Barakat is a 70 y/o post-menopausal F who saw Dr. Andrade for an annual gyn exam in 2019. She reports feeling well without any symptoms, including vaginal discharge or bleeding at that time. TVUS during this visit revealed a thickened endometrium and underwent D&C hysteroscopy with polypectomy on 19. Pathology (reviewed at [facility]) showed fragments of markedly atypical glandular epithelium, highly suspicious for adenocarcinoma. \par \par CT A/P demonstrated a large endometrial mass, probably posterior fibroid and R ovarian cyst. No evidence of metastatic disease was seen.\par \par She was seen by Dr. Terrazas and underwent PITO/BSO on 10/1/19. Surgical pathology revealed well differentiated adenocarcinoma arising in the lower uterine segment, favor entometrioid, grade 1, involving the cervical stroma and 99% myometrium. +LVI, parametrial invasion+ and no lymph nodes were submitted. It was noted that the tumor was multi-focally deeply myoinvasive within the lower uterine segment; appears to undermine endometrium. \par \par Patient subsequently underwent surgical staging on 19 with b/l pelvis LND and pathology revealed 1/6 nodes on the L and 1/2 nodes on the R to be positive for metastatic carcinoma. No perinodal extension was present. \par \par She has recovered from her recent surgeries without any issues and feels well overall today. She was referred to medical oncology and radiation oncology for adjuvant therapy options. She denies fevers, chills, abdominal pain, vaginal bleeding/discharge, CP, SOB, weight loss, urinary symptoms, edema or any other new symptoms. 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  \par Breast biopsy in 2018 with intraductal papilloma, excision of left breast mass with NL in 10/2018 pathology with fibrocystic changes with intraductal papillary hyperplasia.  \par  [de-identified] : adenocarcinoma  [de-identified] : Patient presents via telehealth visit today. She is s/p C5 carbo/taxol as part of the sandwich chemo-RT protocol. She feels very well with good appetite and energy. She did notice body "achiness" after this cycle and thinks that this is from onpro growth factor injection. Symptoms improved with Claritin. She has grade 1 neuropathy, very mild tingling sensation in the tips of the fingers that "comes and goes." She has symptoms from her known sciatica that occasionally she feels, starting from L hip and goes down to the toes. She saw Dr. Terrazas and pelvic exam was uncomfortable more than usual, and was told to get a vaginal dilator.  Otherwise she feels very well. She denies fevers, chills, n/v, f/c, abdominal pain, urinary symptoms, changes in bowel habits or vaginal bleeding/discharge. All other ROS reviewed and negative in detail.  [FreeTextEntry1] : Carbo/taxol x 3 cycles --> EBRT  [Patient] : the patient

## 2020-07-03 NOTE — HISTORY OF PRESENT ILLNESS
[Disease: _____________________] : Disease: [unfilled] [T: ___] : T[unfilled] [AJCC Stage: ____] : AJCC Stage: [unfilled] [N: ___] : N[unfilled] [M: ___] : M[unfilled] [Medical Office: ([facility])___] : at the medical office located in  [Home] : at home, [unfilled] , at the time of the visit. [Family Member] : family member [de-identified] : Ms. Barakat is a 70 y/o post-menopausal F who saw Dr. Andrade for an annual gyn exam in 2019. She reports feeling well without any symptoms, including vaginal discharge or bleeding at that time. TVUS during this visit revealed a thickened endometrium and underwent D&C hysteroscopy with polypectomy on 19. Pathology (reviewed at [facility]) showed fragments of markedly atypical glandular epithelium, highly suspicious for adenocarcinoma. \par \par CT A/P demonstrated a large endometrial mass, probably posterior fibroid and R ovarian cyst. No evidence of metastatic disease was seen.\par \par She was seen by Dr. Terrazas and underwent PITO/BSO on 10/1/19. Surgical pathology revealed well differentiated adenocarcinoma arising in the lower uterine segment, favor entometrioid, grade 1, involving the cervical stroma and 99% myometrium. +LVI, parametrial invasion+ and no lymph nodes were submitted. It was noted that the tumor was multi-focally deeply myoinvasive within the lower uterine segment; appears to undermine endometrium. \par \par Patient subsequently underwent surgical staging on 19 with b/l pelvis LND and pathology revealed 1/6 nodes on the L and 1/2 nodes on the R to be positive for metastatic carcinoma. No perinodal extension was present. \par \par She has recovered from her recent surgeries without any issues and feels well overall today. She was referred to medical oncology and radiation oncology for adjuvant therapy options. She denies fevers, chills, abdominal pain, vaginal bleeding/discharge, CP, SOB, weight loss, urinary symptoms, edema or any other new symptoms. 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  \par Breast biopsy in 2018 with intraductal papilloma, excision of left breast mass with NL in 10/2018 pathology with fibrocystic changes with intraductal papillary hyperplasia.  \par  [de-identified] : adenocarcinoma  [FreeTextEntry1] : Carbo/taxol x 3 cycles --> EBRT  [de-identified] : Patient presents via telehealth visit today, accompanied by her daughter. She is s/p C4 carbo/taxol as part of the sandwich chemo-RT protocol. She feels very well with good appetite and energy. She has no new complaints or symptoms. She does have some skin burning/itching in the perineal area following RT that she is planning to apply a topical cream. Otherwise she feels very well. She denies fevers, chills, n/v, f/c, abdominal pain, urinary symptoms, changes in bowel habits or vaginal bleeding/discharge. All other ROS reviewed and negative in detail.

## 2020-07-03 NOTE — REVIEW OF SYSTEMS
[Fatigue] : fatigue [Negative] : ENT/Mouth [FreeTextEntry7] : general achiness during the 1st week, now resolved  [FreeTextEntry2] : grade 1 neuropathy of finger tips, on and off  [FreeTextEntry4] : vaginal itching and burning after RT

## 2020-07-03 NOTE — OB HISTORY
[Total Preg ___] : : [unfilled] [Vaginal ___] : [unfilled] vaginal delivery(s) [AB Spont ___] : [unfilled] miscarriage(s) [Menarche Age ____] : age at menarche was [unfilled] [Definite ___ (Date)] : the last menstrual period was [unfilled] [Menopause  Age ____] : menopause occurred at age [unfilled]

## 2020-07-03 NOTE — ASSESSMENT
[Curative] : Goals of care discussed with patient: Curative [FreeTextEntry1] : 70 y/o F with recently diagnosed stage IIIC [id number] grade 1 (well differentiated, favor endometrioid) endometrial carcinoma involving b/l pelvic nodes with 99% myometrial invasion and involvement of cervical stroma, +LVI, s/p PITO/BSO and pelvic LN dissection. Patient is currently undergoing adjuvant sandwich chemotherapy-RT therapy. She has completed EBRT directed to the pelvic nodes with VBT boost with Dr. Wright on 5/14. \par \par Patient is s/p C5 carbo/taxol and continue to tolerate chemotherapy very well without any significant toxicities. We discussed ways to manage fatigue, generalized arthralgias, and neuropathy. \par \par Plan:\par -interval bloodwork prior to C6\par -onpro added given neutropenia  \par -f/u Dr. Wright rad onc for post-RT care (patient to call for vaginal dilator)\par -f/u Dr. Terrazas - gyn onc\par -RTC 3 weeks\par \par All of the patient's questions and concerns were addressed in full. She knows to call my office should there be any new concerns or symptoms. \par \par

## 2020-07-03 NOTE — PHYSICAL EXAM
[Fully active, able to carry on all pre-disease performance without restriction] : Status 0 - Fully active, able to carry on all pre-disease performance without restriction [Normal] : supple without JVD, no thyromegaly or masses appreciated [de-identified] : Normal respiratory effort and regular breaths

## 2020-07-03 NOTE — PHYSICAL EXAM
[Fully active, able to carry on all pre-disease performance without restriction] : Status 0 - Fully active, able to carry on all pre-disease performance without restriction [de-identified] : Normal respiratory effort and regular breaths [Normal] : Mood and affect: Normal

## 2020-07-07 ENCOUNTER — APPOINTMENT (OUTPATIENT)
Dept: INFUSION THERAPY | Facility: HOSPITAL | Age: 70
End: 2020-07-07

## 2020-07-10 ENCOUNTER — RESULT REVIEW (OUTPATIENT)
Age: 70
End: 2020-07-10

## 2020-07-10 ENCOUNTER — APPOINTMENT (OUTPATIENT)
Dept: INFUSION THERAPY | Facility: HOSPITAL | Age: 70
End: 2020-07-10

## 2020-07-10 LAB
BASOPHILS # BLD AUTO: 0.02 K/UL — SIGNIFICANT CHANGE UP (ref 0–0.2)
BASOPHILS NFR BLD AUTO: 0.4 % — SIGNIFICANT CHANGE UP (ref 0–2)
EOSINOPHIL # BLD AUTO: 0 K/UL — SIGNIFICANT CHANGE UP (ref 0–0.5)
EOSINOPHIL NFR BLD AUTO: 0 % — SIGNIFICANT CHANGE UP (ref 0–6)
HCT VFR BLD CALC: 29.8 % — LOW (ref 34.5–45)
HGB BLD-MCNC: 9.5 G/DL — LOW (ref 11.5–15.5)
IMM GRANULOCYTES NFR BLD AUTO: 1.4 % — SIGNIFICANT CHANGE UP (ref 0–1.5)
LYMPHOCYTES # BLD AUTO: 0.36 K/UL — LOW (ref 1–3.3)
LYMPHOCYTES # BLD AUTO: 7.1 % — LOW (ref 13–44)
MCHC RBC-ENTMCNC: 28.5 PG — SIGNIFICANT CHANGE UP (ref 27–34)
MCHC RBC-ENTMCNC: 31.9 GM/DL — LOW (ref 32–36)
MCV RBC AUTO: 89.5 FL — SIGNIFICANT CHANGE UP (ref 80–100)
MONOCYTES # BLD AUTO: 0.96 K/UL — HIGH (ref 0–0.9)
MONOCYTES NFR BLD AUTO: 18.9 % — HIGH (ref 2–14)
NEUTROPHILS # BLD AUTO: 3.68 K/UL — SIGNIFICANT CHANGE UP (ref 1.8–7.4)
NEUTROPHILS NFR BLD AUTO: 72.2 % — SIGNIFICANT CHANGE UP (ref 43–77)
NRBC # BLD: 0 /100 WBCS — SIGNIFICANT CHANGE UP (ref 0–0)
PLATELET # BLD AUTO: 130 K/UL — LOW (ref 150–400)
RBC # BLD: 3.33 M/UL — LOW (ref 3.8–5.2)
RBC # FLD: 16.9 % — HIGH (ref 10.3–14.5)
SARS-COV-2 N GENE NPH QL NAA+PROBE: NOT DETECTED
WBC # BLD: 5.09 K/UL — SIGNIFICANT CHANGE UP (ref 3.8–10.5)
WBC # FLD AUTO: 5.09 K/UL — SIGNIFICANT CHANGE UP (ref 3.8–10.5)

## 2020-07-17 ENCOUNTER — OUTPATIENT (OUTPATIENT)
Dept: OUTPATIENT SERVICES | Facility: HOSPITAL | Age: 70
LOS: 1 days | Discharge: HOME | End: 2020-07-17

## 2020-07-17 DIAGNOSIS — Z98.890 OTHER SPECIFIED POSTPROCEDURAL STATES: Chronic | ICD-10-CM

## 2020-07-17 DIAGNOSIS — C54.1 MALIGNANT NEOPLASM OF ENDOMETRIUM: ICD-10-CM

## 2020-07-17 DIAGNOSIS — Z90.710 ACQUIRED ABSENCE OF BOTH CERVIX AND UTERUS: Chronic | ICD-10-CM

## 2020-07-17 DIAGNOSIS — Z87.39 PERSONAL HISTORY OF OTHER DISEASES OF THE MUSCULOSKELETAL SYSTEM AND CONNECTIVE TISSUE: Chronic | ICD-10-CM

## 2020-07-17 DIAGNOSIS — N60.12 DIFFUSE CYSTIC MASTOPATHY OF LEFT BREAST: Chronic | ICD-10-CM

## 2020-07-22 ENCOUNTER — APPOINTMENT (OUTPATIENT)
Dept: HEMATOLOGY ONCOLOGY | Facility: CLINIC | Age: 70
End: 2020-07-22
Payer: MEDICARE

## 2020-07-22 PROCEDURE — 99214 OFFICE O/P EST MOD 30 MIN: CPT | Mod: 95

## 2020-07-23 ENCOUNTER — RESULT REVIEW (OUTPATIENT)
Age: 70
End: 2020-07-23

## 2020-07-23 ENCOUNTER — APPOINTMENT (OUTPATIENT)
Dept: INFUSION THERAPY | Facility: HOSPITAL | Age: 70
End: 2020-07-23

## 2020-07-23 ENCOUNTER — LABORATORY RESULT (OUTPATIENT)
Age: 70
End: 2020-07-23

## 2020-07-23 LAB
BASOPHILS # BLD AUTO: 0 K/UL — SIGNIFICANT CHANGE UP (ref 0–0.2)
BASOPHILS NFR BLD AUTO: 0 % — SIGNIFICANT CHANGE UP (ref 0–2)
EOSINOPHIL # BLD AUTO: 0 K/UL — SIGNIFICANT CHANGE UP (ref 0–0.5)
EOSINOPHIL NFR BLD AUTO: 0 % — SIGNIFICANT CHANGE UP (ref 0–6)
HCT VFR BLD CALC: 26.4 % — LOW (ref 34.5–45)
HGB BLD-MCNC: 8.3 G/DL — LOW (ref 11.5–15.5)
LYMPHOCYTES # BLD AUTO: 0.59 K/UL — LOW (ref 1–3.3)
LYMPHOCYTES # BLD AUTO: 6 % — LOW (ref 13–44)
MCHC RBC-ENTMCNC: 29.2 PG — SIGNIFICANT CHANGE UP (ref 27–34)
MCHC RBC-ENTMCNC: 31.4 GM/DL — LOW (ref 32–36)
MCV RBC AUTO: 93 FL — SIGNIFICANT CHANGE UP (ref 80–100)
MONOCYTES # BLD AUTO: 0.59 K/UL — SIGNIFICANT CHANGE UP (ref 0–0.9)
MONOCYTES NFR BLD AUTO: 6 % — SIGNIFICANT CHANGE UP (ref 2–14)
NEUTROPHILS # BLD AUTO: 8.62 K/UL — HIGH (ref 1.8–7.4)
NEUTROPHILS NFR BLD AUTO: 88 % — HIGH (ref 43–77)
NRBC # BLD: 5 /100 — HIGH (ref 0–0)
NRBC # BLD: SIGNIFICANT CHANGE UP /100 WBCS (ref 0–0)
PLAT MORPH BLD: NORMAL — SIGNIFICANT CHANGE UP
PLATELET # BLD AUTO: 79 K/UL — LOW (ref 150–400)
RBC # BLD: 2.84 M/UL — LOW (ref 3.8–5.2)
RBC # FLD: 18.2 % — HIGH (ref 10.3–14.5)
RBC BLD AUTO: SIGNIFICANT CHANGE UP
WBC # BLD: 9.8 K/UL — SIGNIFICANT CHANGE UP (ref 3.8–10.5)
WBC # FLD AUTO: 9.8 K/UL — SIGNIFICANT CHANGE UP (ref 3.8–10.5)

## 2020-07-25 NOTE — REVIEW OF SYSTEMS
[FreeTextEntry2] : grade 1 neuropathy of finger tips, on and off  [Fatigue] : fatigue [Negative] : Musculoskeletal [FreeTextEntry7] : general achiness during the 1st week, now resolved  [FreeTextEntry4] : vaginal itching and burning after RT

## 2020-07-25 NOTE — OB HISTORY
[AB Spont ___] : [unfilled] miscarriage(s) [Vaginal ___] : [unfilled] vaginal delivery(s) [Total Preg ___] : : [unfilled] [Menarche Age ____] : age at menarche was [unfilled] [Definite ___ (Date)] : the last menstrual period was [unfilled] [Menopause  Age ____] : menopause occurred at age [unfilled]

## 2020-07-25 NOTE — ASSESSMENT
[Curative] : Goals of care discussed with patient: Curative [FreeTextEntry1] : 68 y/o F with recently diagnosed stage IIIC [id number] grade 1 (well differentiated, favor endometrioid) endometrial carcinoma involving b/l pelvic nodes with 99% myometrial invasion and involvement of cervical stroma, +LVI, s/p PITO/BSO and pelvic LN dissection. Patient is currently undergoing adjuvant sandwich chemotherapy-RT therapy. She has completed EBRT directed to the pelvic nodes with VBT boost with Dr. Wright on 5/14. \par \par Patient is s/p C6 carbo/taxol and tolerated chemotherapy very well without significant toxicities. We discussed that at this point she has completed her adjuvant therapy and reviewed the surveillance monitoring plans. We will schedule post-treatment CT scans. We reviewed signs and symptoms of disease recurrence and she demonstrated good understanding of all that was presented.\par \par Plan:\par -interval bloodwork scheduled this week\par -post-treatment CT scans ordered\par -f/u Dr. Wright rad onc for post-RT care \par -f/u Dr. Terrazas - gyn onc\par -RTC every 3 months for surveillance monitoring \par \par All of the patient's questions and concerns were addressed in full. She knows to call my office should there be any new concerns or symptoms. \par \par

## 2020-07-25 NOTE — HISTORY OF PRESENT ILLNESS
[Disease: _____________________] : Disease: [unfilled] [T: ___] : T[unfilled] [N: ___] : N[unfilled] [AJCC Stage: ____] : AJCC Stage: [unfilled] [M: ___] : M[unfilled] [de-identified] : adenocarcinoma  [de-identified] : Ms. Barakat is a 68 y/o post-menopausal F who saw Dr. Andrade for an annual gyn exam in 2019. She reports feeling well without any symptoms, including vaginal discharge or bleeding at that time. TVUS during this visit revealed a thickened endometrium and underwent D&C hysteroscopy with polypectomy on 19. Pathology (reviewed at [facility]) showed fragments of markedly atypical glandular epithelium, highly suspicious for adenocarcinoma. \par \par CT A/P demonstrated a large endometrial mass, probably posterior fibroid and R ovarian cyst. No evidence of metastatic disease was seen.\par \par She was seen by Dr. Terrazas and underwent PITO/BSO on 10/1/19. Surgical pathology revealed well differentiated adenocarcinoma arising in the lower uterine segment, favor entometrioid, grade 1, involving the cervical stroma and 99% myometrium. +LVI, parametrial invasion+ and no lymph nodes were submitted. It was noted that the tumor was multi-focally deeply myoinvasive within the lower uterine segment; appears to undermine endometrium. \par \par Patient subsequently underwent surgical staging on 19 with b/l pelvis LND and pathology revealed 1/6 nodes on the L and 1/2 nodes on the R to be positive for metastatic carcinoma. No perinodal extension was present. \par \par She has recovered from her recent surgeries without any issues and feels well overall today. She was referred to medical oncology and radiation oncology for adjuvant therapy options. She denies fevers, chills, abdominal pain, vaginal bleeding/discharge, CP, SOB, weight loss, urinary symptoms, edema or any other new symptoms. 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  \par Breast biopsy in 2018 with intraductal papilloma, excision of left breast mass with NL in 10/2018 pathology with fibrocystic changes with intraductal papillary hyperplasia.  \par  [FreeTextEntry1] : Carbo/taxol x 3 cycles --> EBRT  [de-identified] : Patient presents via telehealth visit today. \par She has completed C6 carbo/taxol as part of the sandwich chemo-RT protocol. She feels very well with good appetite and energy. She had mild arthralgias and achiness as usual during the first week after chemotherapy which improved with Claritin and Aspercreme. She has her chronic L sciatica L hip discomfort that is stable. No other symptoms. She denies fevers, chills, n/v, f/c, abdominal pain, urinary symptoms, changes in bowel habits or vaginal bleeding/discharge. All other ROS reviewed and negative in detail.  [Medical Office: ([facility])___] : at the medical office located in  [Family Member] : family member [Home] : at home, [unfilled] , at the time of the visit. [Patient] : the patient

## 2020-07-25 NOTE — PHYSICAL EXAM
[Fully active, able to carry on all pre-disease performance without restriction] : Status 0 - Fully active, able to carry on all pre-disease performance without restriction [de-identified] : Normal respiratory effort and regular breaths [Normal] : No focal neurologic defects observed

## 2020-07-31 ENCOUNTER — RESULT REVIEW (OUTPATIENT)
Age: 70
End: 2020-07-31

## 2020-07-31 ENCOUNTER — LABORATORY RESULT (OUTPATIENT)
Age: 70
End: 2020-07-31

## 2020-07-31 ENCOUNTER — APPOINTMENT (OUTPATIENT)
Dept: INFUSION THERAPY | Facility: HOSPITAL | Age: 70
End: 2020-07-31

## 2020-07-31 LAB
BASOPHILS # BLD AUTO: 0.01 K/UL — SIGNIFICANT CHANGE UP (ref 0–0.2)
BASOPHILS NFR BLD AUTO: 0.2 % — SIGNIFICANT CHANGE UP (ref 0–2)
EOSINOPHIL # BLD AUTO: 0.01 K/UL — SIGNIFICANT CHANGE UP (ref 0–0.5)
EOSINOPHIL NFR BLD AUTO: 0.2 % — SIGNIFICANT CHANGE UP (ref 0–6)
HCT VFR BLD CALC: 26.8 % — LOW (ref 34.5–45)
HGB BLD-MCNC: 8.5 G/DL — LOW (ref 11.5–15.5)
IMM GRANULOCYTES NFR BLD AUTO: 0.2 % — SIGNIFICANT CHANGE UP (ref 0–1.5)
LYMPHOCYTES # BLD AUTO: 0.4 K/UL — LOW (ref 1–3.3)
LYMPHOCYTES # BLD AUTO: 9.3 % — LOW (ref 13–44)
MCHC RBC-ENTMCNC: 29.4 PG — SIGNIFICANT CHANGE UP (ref 27–34)
MCHC RBC-ENTMCNC: 31.7 GM/DL — LOW (ref 32–36)
MCV RBC AUTO: 92.7 FL — SIGNIFICANT CHANGE UP (ref 80–100)
MONOCYTES # BLD AUTO: 0.7 K/UL — SIGNIFICANT CHANGE UP (ref 0–0.9)
MONOCYTES NFR BLD AUTO: 16.2 % — HIGH (ref 2–14)
NEUTROPHILS # BLD AUTO: 3.18 K/UL — SIGNIFICANT CHANGE UP (ref 1.8–7.4)
NEUTROPHILS NFR BLD AUTO: 73.9 % — SIGNIFICANT CHANGE UP (ref 43–77)
NRBC # BLD: 0 /100 WBCS — SIGNIFICANT CHANGE UP (ref 0–0)
PLATELET # BLD AUTO: 132 K/UL — LOW (ref 150–400)
RBC # BLD: 2.89 M/UL — LOW (ref 3.8–5.2)
RBC # FLD: 19.4 % — HIGH (ref 10.3–14.5)
WBC # BLD: 4.31 K/UL — SIGNIFICANT CHANGE UP (ref 3.8–10.5)
WBC # FLD AUTO: 4.31 K/UL — SIGNIFICANT CHANGE UP (ref 3.8–10.5)

## 2020-08-05 ENCOUNTER — OUTPATIENT (OUTPATIENT)
Dept: OUTPATIENT SERVICES | Facility: HOSPITAL | Age: 70
LOS: 1 days | End: 2020-08-05
Payer: MEDICARE

## 2020-08-05 ENCOUNTER — APPOINTMENT (OUTPATIENT)
Dept: CT IMAGING | Facility: IMAGING CENTER | Age: 70
End: 2020-08-05
Payer: MEDICARE

## 2020-08-05 DIAGNOSIS — Z98.890 OTHER SPECIFIED POSTPROCEDURAL STATES: Chronic | ICD-10-CM

## 2020-08-05 DIAGNOSIS — N60.12 DIFFUSE CYSTIC MASTOPATHY OF LEFT BREAST: Chronic | ICD-10-CM

## 2020-08-05 DIAGNOSIS — C54.1 MALIGNANT NEOPLASM OF ENDOMETRIUM: ICD-10-CM

## 2020-08-05 DIAGNOSIS — Z90.710 ACQUIRED ABSENCE OF BOTH CERVIX AND UTERUS: Chronic | ICD-10-CM

## 2020-08-05 DIAGNOSIS — Z87.39 PERSONAL HISTORY OF OTHER DISEASES OF THE MUSCULOSKELETAL SYSTEM AND CONNECTIVE TISSUE: Chronic | ICD-10-CM

## 2020-08-05 PROCEDURE — 74177 CT ABD & PELVIS W/CONTRAST: CPT

## 2020-08-05 PROCEDURE — 71260 CT THORAX DX C+: CPT

## 2020-08-05 PROCEDURE — 71260 CT THORAX DX C+: CPT | Mod: 26

## 2020-08-05 PROCEDURE — 74177 CT ABD & PELVIS W/CONTRAST: CPT | Mod: 26

## 2020-08-12 ENCOUNTER — APPOINTMENT (OUTPATIENT)
Dept: HEMATOLOGY ONCOLOGY | Facility: CLINIC | Age: 70
End: 2020-08-12
Payer: MEDICARE

## 2020-08-12 PROCEDURE — 99214 OFFICE O/P EST MOD 30 MIN: CPT | Mod: 95

## 2020-08-29 NOTE — PHYSICAL EXAM
[Fully active, able to carry on all pre-disease performance without restriction] : Status 0 - Fully active, able to carry on all pre-disease performance without restriction [Normal] : affect appropriate [de-identified] : Normal respiratory effort and regular breaths

## 2020-08-29 NOTE — HISTORY OF PRESENT ILLNESS
[Disease: _____________________] : Disease: [unfilled] [T: ___] : T[unfilled] [N: ___] : N[unfilled] [M: ___] : M[unfilled] [AJCC Stage: ____] : AJCC Stage: [unfilled] [Home] : at home, [unfilled] , at the time of the visit. [Medical Office: ([facility])___] : at the medical office located in  [Family Member] : family member [Patient] : the patient [de-identified] : Ms. Barakat is a 68 y/o post-menopausal F who saw Dr. Andrade for an annual gyn exam in 2019. She reports feeling well without any symptoms, including vaginal discharge or bleeding at that time. TVUS during this visit revealed a thickened endometrium and underwent D&C hysteroscopy with polypectomy on 19. Pathology (reviewed at [facility]) showed fragments of markedly atypical glandular epithelium, highly suspicious for adenocarcinoma. \par \par CT A/P demonstrated a large endometrial mass, probably posterior fibroid and R ovarian cyst. No evidence of metastatic disease was seen.\par \par She was seen by Dr. Terrazas and underwent PITO/BSO on 10/1/19. Surgical pathology revealed well differentiated adenocarcinoma arising in the lower uterine segment, favor entometrioid, grade 1, involving the cervical stroma and 99% myometrium. +LVI, parametrial invasion+ and no lymph nodes were submitted. It was noted that the tumor was multi-focally deeply myoinvasive within the lower uterine segment; appears to undermine endometrium. \par \par Patient subsequently underwent surgical staging on 19 with b/l pelvis LND and pathology revealed 1/6 nodes on the L and 1/2 nodes on the R to be positive for metastatic carcinoma. No perinodal extension was present. \par \par She has recovered from her recent surgeries without any issues and feels well overall today. She was referred to medical oncology and radiation oncology for adjuvant therapy options. She denies fevers, chills, abdominal pain, vaginal bleeding/discharge, CP, SOB, weight loss, urinary symptoms, edema or any other new symptoms. 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  \par Breast biopsy in 2018 with intraductal papilloma, excision of left breast mass with NL in 10/2018 pathology with fibrocystic changes with intraductal papillary hyperplasia.  \par  [de-identified] : adenocarcinoma  [FreeTextEntry1] : Carbo/taxol x 3 cycles --> EBRT  [de-identified] : Patient presents via telehealth visit today. \par She has completed 6 cycles of carbo/taxol as part of the sandwich chemo-RT protocol. She feels very well overall with good appetite and energy. Arthralgias and myalgias have resolved since last visit. She has her chronic L sciatica L hip discomfort that is stable. No other new symptoms. She denies fevers, chills, n/v, f/c, abdominal pain, urinary symptoms, changes in bowel habits or vaginal bleeding/discharge. All other ROS reviewed and negative in detail.

## 2020-08-29 NOTE — REVIEW OF SYSTEMS
[Negative] : Musculoskeletal [Fatigue] : fatigue [FreeTextEntry2] : grade 1 neuropathy of finger tips, on and off  [FreeTextEntry4] : vaginal itching and burning after RT  [FreeTextEntry7] : general achiness during the 1st week, now resolved

## 2020-08-29 NOTE — ASSESSMENT
[Curative] : Goals of care discussed with patient: Curative [FreeTextEntry1] : 68 y/o F with recently diagnosed stage IIIC [id number] grade 1 (well differentiated, favor endometrioid) endometrial carcinoma involving b/l pelvic nodes with 99% myometrial invasion and involvement of cervical stroma, +LVI, s/p PITO/BSO and pelvic LN dissection. Patient is currently undergoing adjuvant sandwich chemotherapy-RT therapy. She has completed EBRT directed to the pelvic nodes with VBT boost with Dr. Wright on 5/14. \par \par Patient is s/p 6 cycles carbo/taxol with radiation between C3 and C4 per sandwich protocol. She has tolerated chemotherapy well overall and is doing well clinically. We discussed the surveillance plans going forward. We also reviewed signs and symptoms of disease recurrence and she demonstrated good understanding of all that was presented.\par \par Post-treatment CT scans 8/5/20 reviewed and c/w no evidence of metastatic disease. \par \par Plan:\par -f/u 3 months in clinic \par -f/u Dr. Wright rad onc for post-RT care \par -f/u Dr. Terrazas - gyn onc\par -RTC every 3 months for surveillance monitoring \par -discussed HCM including mammogram, for which patient will follow up with breast surgery for f/u of R breast biopsy that showed calcifications \par \par All of the patient's questions and concerns were addressed in full. She knows to call my office should there be any new concerns or symptoms. \par \par

## 2020-08-29 NOTE — RESULTS/DATA
[FreeTextEntry1] : CT CAP 8/5/20:\par \par FINDINGS:\par CHEST:\par LUNGS AND LARGE AIRWAYS: Mucous impacted distal airways in the right lower lobe.\par PLEURA: No pleural effusion.\par VESSELS: Within normal limits.\par HEART: Heart size is normal. No pericardial effusion.\par MEDIASTINUM AND MAIRA: No lymphadenopathy.\par CHEST WALL AND LOWER NECK: Right chest wall port with catheter tip terminating in the superior cavoatrial junction.\par \par ABDOMEN AND PELVIS:\par LIVER: Subcentimeter hypodense hepatic lesions, too small to characterize.\par BILE DUCTS: Normal caliber.\par GALLBLADDER: Within normal limits.\par SPLEEN: Within normal limits.\par PANCREAS: Within normal limits.\par ADRENALS: Within normal limits.\par KIDNEYS/URETERS: No hydronephrosis. Right lower pole renal calculus, measuring 0.8 cm.\par \par BLADDER: Within normal limits.\par REPRODUCTIVE ORGANS: Hysterectomy.\par \par BOWEL: No bowel obstruction.\par PERITONEUM: No ascites.\par VESSELS: Atherosclerotic changes.\par RETROPERITONEUM/LYMPH NODES: No lymphadenopathy.\par ABDOMINAL WALL: Anasarca.\par BONES: Degenerative changes. Scoliosis.\par \par IMPRESSION:\par No metastatic disease in the chest, abdomen, or pelvis.

## 2020-08-31 ENCOUNTER — APPOINTMENT (OUTPATIENT)
Dept: SURGERY | Facility: CLINIC | Age: 70
End: 2020-08-31

## 2020-09-12 ENCOUNTER — OUTPATIENT (OUTPATIENT)
Dept: OUTPATIENT SERVICES | Facility: HOSPITAL | Age: 70
LOS: 1 days | Discharge: HOME | End: 2020-09-12

## 2020-09-12 DIAGNOSIS — Z98.890 OTHER SPECIFIED POSTPROCEDURAL STATES: Chronic | ICD-10-CM

## 2020-09-12 DIAGNOSIS — Z87.39 PERSONAL HISTORY OF OTHER DISEASES OF THE MUSCULOSKELETAL SYSTEM AND CONNECTIVE TISSUE: Chronic | ICD-10-CM

## 2020-09-12 DIAGNOSIS — Z90.710 ACQUIRED ABSENCE OF BOTH CERVIX AND UTERUS: Chronic | ICD-10-CM

## 2020-09-12 DIAGNOSIS — N60.12 DIFFUSE CYSTIC MASTOPATHY OF LEFT BREAST: Chronic | ICD-10-CM

## 2020-09-12 DIAGNOSIS — C54.1 MALIGNANT NEOPLASM OF ENDOMETRIUM: ICD-10-CM

## 2020-09-16 ENCOUNTER — RESULT REVIEW (OUTPATIENT)
Age: 70
End: 2020-09-16

## 2020-09-16 ENCOUNTER — LABORATORY RESULT (OUTPATIENT)
Age: 70
End: 2020-09-16

## 2020-09-16 ENCOUNTER — APPOINTMENT (OUTPATIENT)
Dept: HEMATOLOGY ONCOLOGY | Facility: CLINIC | Age: 70
End: 2020-09-16

## 2020-09-16 LAB
BASOPHILS # BLD AUTO: 0.02 K/UL — SIGNIFICANT CHANGE UP (ref 0–0.2)
BASOPHILS NFR BLD AUTO: 1 % — SIGNIFICANT CHANGE UP (ref 0–2)
EOSINOPHIL # BLD AUTO: 0.03 K/UL — SIGNIFICANT CHANGE UP (ref 0–0.5)
EOSINOPHIL NFR BLD AUTO: 1.5 % — SIGNIFICANT CHANGE UP (ref 0–6)
HCT VFR BLD CALC: 32.3 % — LOW (ref 34.5–45)
HGB BLD-MCNC: 10.4 G/DL — LOW (ref 11.5–15.5)
IMM GRANULOCYTES NFR BLD AUTO: 0.5 % — SIGNIFICANT CHANGE UP (ref 0–1.5)
LYMPHOCYTES # BLD AUTO: 0.35 K/UL — LOW (ref 1–3.3)
LYMPHOCYTES # BLD AUTO: 17.3 % — SIGNIFICANT CHANGE UP (ref 13–44)
MCHC RBC-ENTMCNC: 30.3 PG — SIGNIFICANT CHANGE UP (ref 27–34)
MCHC RBC-ENTMCNC: 32.2 G/DL — SIGNIFICANT CHANGE UP (ref 32–36)
MCV RBC AUTO: 94.2 FL — SIGNIFICANT CHANGE UP (ref 80–100)
MONOCYTES # BLD AUTO: 0.34 K/UL — SIGNIFICANT CHANGE UP (ref 0–0.9)
MONOCYTES NFR BLD AUTO: 16.8 % — HIGH (ref 2–14)
NEUTROPHILS # BLD AUTO: 1.27 K/UL — LOW (ref 1.8–7.4)
NEUTROPHILS NFR BLD AUTO: 62.9 % — SIGNIFICANT CHANGE UP (ref 43–77)
NRBC # BLD: 0 /100 WBCS — SIGNIFICANT CHANGE UP (ref 0–0)
PLATELET # BLD AUTO: 144 K/UL — LOW (ref 150–400)
RBC # BLD: 3.43 M/UL — LOW (ref 3.8–5.2)
RBC # FLD: 13.8 % — SIGNIFICANT CHANGE UP (ref 10.3–14.5)
WBC # BLD: 2.02 K/UL — LOW (ref 3.8–10.5)
WBC # FLD AUTO: 2.02 K/UL — LOW (ref 3.8–10.5)

## 2020-11-13 ENCOUNTER — OUTPATIENT (OUTPATIENT)
Dept: OUTPATIENT SERVICES | Facility: HOSPITAL | Age: 70
LOS: 1 days | Discharge: HOME | End: 2020-11-13

## 2020-11-13 DIAGNOSIS — Z90.710 ACQUIRED ABSENCE OF BOTH CERVIX AND UTERUS: Chronic | ICD-10-CM

## 2020-11-13 DIAGNOSIS — Z98.890 OTHER SPECIFIED POSTPROCEDURAL STATES: Chronic | ICD-10-CM

## 2020-11-13 DIAGNOSIS — Z87.39 PERSONAL HISTORY OF OTHER DISEASES OF THE MUSCULOSKELETAL SYSTEM AND CONNECTIVE TISSUE: Chronic | ICD-10-CM

## 2020-11-13 DIAGNOSIS — N60.12 DIFFUSE CYSTIC MASTOPATHY OF LEFT BREAST: Chronic | ICD-10-CM

## 2020-11-13 DIAGNOSIS — C54.1 MALIGNANT NEOPLASM OF ENDOMETRIUM: ICD-10-CM

## 2020-11-16 ENCOUNTER — APPOINTMENT (OUTPATIENT)
Dept: HEMATOLOGY ONCOLOGY | Facility: CLINIC | Age: 70
End: 2020-11-16

## 2020-11-16 NOTE — PHYSICAL EXAM
[Fully active, able to carry on all pre-disease performance without restriction] : Status 0 - Fully active, able to carry on all pre-disease performance without restriction [Normal] : affect appropriate [de-identified] : Normal respiratory effort and regular breaths

## 2020-11-16 NOTE — ASSESSMENT
[FreeTextEntry1] : 68 y/o F with recently diagnosed stage IIIC [id number] grade 1 (well differentiated, favor endometrioid) endometrial carcinoma involving b/l pelvic nodes with 99% myometrial invasion and involvement of cervical stroma, +LVI, s/p PIOT/BSO and pelvic LN dissection. Patient is currently undergoing adjuvant sandwich chemotherapy-RT therapy. She has completed EBRT directed to the pelvic nodes with VBT boost with Dr. Wright on 5/14. \par \par Patient is s/p 6 cycles carbo/taxol with radiation between C3 and C4 per sandwich protocol. She has tolerated chemotherapy well overall and is doing well clinically. We discussed the surveillance plans going forward. We also reviewed signs and symptoms of disease recurrence and she demonstrated good understanding of all that was presented.\par \par Post-treatment CT scans 8/5/20 reviewed and c/w no evidence of metastatic disease. \par \par Plan:\par -f/u 3 months in clinic \par -f/u Dr. Wright rad onc for post-RT care \par -f/u Dr. Terrazas - gyn onc\par -RTC every 3 months for surveillance monitoring \par -discussed HCM including mammogram, for which patient will follow up with breast surgery for f/u of R breast biopsy that showed calcifications \par \par All of the patient's questions and concerns were addressed in full. She knows to call my office should there be any new concerns or symptoms. \par \par  [Curative] : Goals of care discussed with patient: Curative

## 2020-11-16 NOTE — HISTORY OF PRESENT ILLNESS
[Disease: _____________________] : Disease: [unfilled] [T: ___] : T[unfilled] [N: ___] : N[unfilled] [M: ___] : M[unfilled] [AJCC Stage: ____] : AJCC Stage: [unfilled] [de-identified] : Ms. Barakat is a 70 y/o post-menopausal F who saw Dr. Andrade for an annual gyn exam in 2019. She reports feeling well without any symptoms, including vaginal discharge or bleeding at that time. TVUS during this visit revealed a thickened endometrium and underwent D&C hysteroscopy with polypectomy on 19. Pathology (reviewed at [facility]) showed fragments of markedly atypical glandular epithelium, highly suspicious for adenocarcinoma. \par \par CT A/P demonstrated a large endometrial mass, probably posterior fibroid and R ovarian cyst. No evidence of metastatic disease was seen.\par \par She was seen by Dr. Terrazas and underwent PITO/BSO on 10/1/19. Surgical pathology revealed well differentiated adenocarcinoma arising in the lower uterine segment, favor entometrioid, grade 1, involving the cervical stroma and 99% myometrium. +LVI, parametrial invasion+ and no lymph nodes were submitted. It was noted that the tumor was multi-focally deeply myoinvasive within the lower uterine segment; appears to undermine endometrium. \par \par Patient subsequently underwent surgical staging on 19 with b/l pelvis LND and pathology revealed 1/6 nodes on the L and 1/2 nodes on the R to be positive for metastatic carcinoma. No perinodal extension was present. \par \par She has recovered from her recent surgeries without any issues and feels well overall today. She was referred to medical oncology and radiation oncology for adjuvant therapy options. She denies fevers, chills, abdominal pain, vaginal bleeding/discharge, CP, SOB, weight loss, urinary symptoms, edema or any other new symptoms. 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  \par Breast biopsy in 2018 with intraductal papilloma, excision of left breast mass with NL in 10/2018 pathology with fibrocystic changes with intraductal papillary hyperplasia.  \par  [de-identified] : adenocarcinoma  [FreeTextEntry1] : Carbo/taxol x 3 cycles --> EBRT  [de-identified] : \par She has completed 6 cycles of carbo/taxol as part of the sandwich chemo-RT protocol. She feels very well overall with good appetite and energy. Arthralgias and myalgias have resolved since last visit. She has her chronic L sciatica L hip discomfort that is stable. No other new symptoms. She denies fevers, chills, n/v, f/c, abdominal pain, urinary symptoms, changes in bowel habits or vaginal bleeding/discharge. All other ROS reviewed and negative in detail.

## 2020-11-23 ENCOUNTER — APPOINTMENT (OUTPATIENT)
Dept: HEMATOLOGY ONCOLOGY | Facility: CLINIC | Age: 70
End: 2020-11-23
Payer: MEDICARE

## 2020-11-23 ENCOUNTER — RESULT REVIEW (OUTPATIENT)
Age: 70
End: 2020-11-23

## 2020-11-23 VITALS
TEMPERATURE: 98.4 F | OXYGEN SATURATION: 98 % | HEART RATE: 81 BPM | BODY MASS INDEX: 24.06 KG/M2 | SYSTOLIC BLOOD PRESSURE: 127 MMHG | DIASTOLIC BLOOD PRESSURE: 86 MMHG | HEIGHT: 58.19 IN | RESPIRATION RATE: 16 BRPM | WEIGHT: 116.18 LBS

## 2020-11-23 LAB
BASOPHILS # BLD AUTO: 0.01 K/UL — SIGNIFICANT CHANGE UP (ref 0–0.2)
BASOPHILS NFR BLD AUTO: 0.4 % — SIGNIFICANT CHANGE UP (ref 0–2)
EOSINOPHIL # BLD AUTO: 0.05 K/UL — SIGNIFICANT CHANGE UP (ref 0–0.5)
EOSINOPHIL NFR BLD AUTO: 2.1 % — SIGNIFICANT CHANGE UP (ref 0–6)
HCT VFR BLD CALC: 35.4 % — SIGNIFICANT CHANGE UP (ref 34.5–45)
HGB BLD-MCNC: 11.2 G/DL — LOW (ref 11.5–15.5)
IMM GRANULOCYTES NFR BLD AUTO: 0.4 % — SIGNIFICANT CHANGE UP (ref 0–1.5)
LYMPHOCYTES # BLD AUTO: 0.51 K/UL — LOW (ref 1–3.3)
LYMPHOCYTES # BLD AUTO: 21.7 % — SIGNIFICANT CHANGE UP (ref 13–44)
MCHC RBC-ENTMCNC: 28.2 PG — SIGNIFICANT CHANGE UP (ref 27–34)
MCHC RBC-ENTMCNC: 31.6 G/DL — LOW (ref 32–36)
MCV RBC AUTO: 89.2 FL — SIGNIFICANT CHANGE UP (ref 80–100)
MONOCYTES # BLD AUTO: 0.31 K/UL — SIGNIFICANT CHANGE UP (ref 0–0.9)
MONOCYTES NFR BLD AUTO: 13.2 % — SIGNIFICANT CHANGE UP (ref 2–14)
NEUTROPHILS # BLD AUTO: 1.46 K/UL — LOW (ref 1.8–7.4)
NEUTROPHILS NFR BLD AUTO: 62.2 % — SIGNIFICANT CHANGE UP (ref 43–77)
NRBC # BLD: 0 /100 WBCS — SIGNIFICANT CHANGE UP (ref 0–0)
PLATELET # BLD AUTO: 178 K/UL — SIGNIFICANT CHANGE UP (ref 150–400)
RBC # BLD: 3.97 M/UL — SIGNIFICANT CHANGE UP (ref 3.8–5.2)
RBC # FLD: 14.4 % — SIGNIFICANT CHANGE UP (ref 10.3–14.5)
WBC # BLD: 2.35 K/UL — LOW (ref 3.8–10.5)
WBC # FLD AUTO: 2.35 K/UL — LOW (ref 3.8–10.5)

## 2020-11-23 PROCEDURE — 99214 OFFICE O/P EST MOD 30 MIN: CPT

## 2020-11-23 NOTE — HISTORY OF PRESENT ILLNESS
[Disease: _____________________] : Disease: [unfilled] [T: ___] : T[unfilled] [N: ___] : N[unfilled] [M: ___] : M[unfilled] [AJCC Stage: ____] : AJCC Stage: [unfilled] [de-identified] : Ms. Barakat is a 68 y/o post-menopausal F who saw Dr. Andrade for an annual gyn exam in 2019. She reports feeling well without any symptoms, including vaginal discharge or bleeding at that time. TVUS during this visit revealed a thickened endometrium and underwent D&C hysteroscopy with polypectomy on 19. Pathology (reviewed at [facility]) showed fragments of markedly atypical glandular epithelium, highly suspicious for adenocarcinoma. \par \par CT A/P demonstrated a large endometrial mass, probably posterior fibroid and R ovarian cyst. No evidence of metastatic disease was seen.\par \par She was seen by Dr. Terrazas and underwent PITO/BSO on 10/1/19. Surgical pathology revealed well differentiated adenocarcinoma arising in the lower uterine segment, favor entometrioid, grade 1, involving the cervical stroma and 99% myometrium. +LVI, parametrial invasion+ and no lymph nodes were submitted. It was noted that the tumor was multi-focally deeply myoinvasive within the lower uterine segment; appears to undermine endometrium. \par \par Patient subsequently underwent surgical staging on 19 with b/l pelvis LND and pathology revealed 1/6 nodes on the L and 1/2 nodes on the R to be positive for metastatic carcinoma. No perinodal extension was present. \par \par She has recovered from her recent surgeries without any issues and feels well overall today. She was referred to medical oncology and radiation oncology for adjuvant therapy options. She denies fevers, chills, abdominal pain, vaginal bleeding/discharge, CP, SOB, weight loss, urinary symptoms, edema or any other new symptoms. 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  \par Breast biopsy in 2018 with intraductal papilloma, excision of left breast mass with NL in 10/2018 pathology with fibrocystic changes with intraductal papillary hyperplasia.  \par  [de-identified] : adenocarcinoma  [FreeTextEntry1] : Carbo/taxol x 3 cycles --> EBRT  [de-identified] : \par She has completed 6 cycles of carbo/taxol as part of the sandwich chemo-RT protocol. She feels very well overall with good appetite and energy. Arthralgias and myalgias have resolved since last visit. She has her chronic L sciatica L hip discomfort that is stable. No other new symptoms. She denies fevers, chills, n/v, f/c, abdominal pain, urinary symptoms, changes in bowel habits or vaginal bleeding/discharge. All other ROS reviewed and negative in detail. \par \par appetite/energy good\par feeling well\par intermittent urinary symptoms - feels urgency but no burning or bleed\par bowel movements \par Feels that she has residual SE from radiation still\par regular BMs \par \par had mammogram and US this summer and was told that there were no concerns for a biopsy\par At [place] [facility] [address]

## 2020-11-23 NOTE — ASSESSMENT
[Curative] : Goals of care discussed with patient: Curative [FreeTextEntry1] : 68 y/o F with recently diagnosed stage IIIC [id number] grade 1 (well differentiated, favor endometrioid) endometrial carcinoma involving b/l pelvic nodes with 99% myometrial invasion and involvement of cervical stroma, +LVI, s/p PITO/BSO and pelvic LN dissection. Patient is currently undergoing adjuvant sandwich chemotherapy-RT therapy. She has completed EBRT directed to the pelvic nodes with VBT boost with Dr. Wright on 5/14. \par \par Patient is s/p 6 cycles carbo/taxol with radiation between C3 and C4 per sandwich protocol. She has tolerated chemotherapy well overall and is doing well clinically. We discussed the surveillance plans going forward. We also reviewed signs and symptoms of disease recurrence and she demonstrated good understanding of all that was presented.\par \par Post-treatment CT scans 8/5/20 reviewed and c/w no evidence of metastatic disease. \par \par Plan:\par -f/u 3 months in clinic \par -f/u Dr. Wright rad onc for post-RT care \par -f/u Dr. Terrazas - gyn onc\par -RTC every 3 months for surveillance monitoring \par -discussed HCM including mammogram, for which patient will follow up with breast surgery for f/u of R breast biopsy that showed calcifications \par \par All of the patient's questions and concerns were addressed in full. She knows to call my office should there be any new concerns or symptoms. \par \par

## 2020-11-23 NOTE — PHYSICAL EXAM
[Fully active, able to carry on all pre-disease performance without restriction] : Status 0 - Fully active, able to carry on all pre-disease performance without restriction [Normal] : affect appropriate [de-identified] : Normal respiratory effort and regular breaths

## 2020-11-24 LAB
ALBUMIN SERPL ELPH-MCNC: 4.2 G/DL
ALP BLD-CCNC: 109 U/L
ALT SERPL-CCNC: 17 U/L
ANION GAP SERPL CALC-SCNC: 9 MMOL/L
APPEARANCE: CLEAR
AST SERPL-CCNC: 26 U/L
BILIRUB SERPL-MCNC: 0.3 MG/DL
BILIRUBIN URINE: NEGATIVE
BLOOD URINE: NEGATIVE
BUN SERPL-MCNC: 12 MG/DL
CALCIUM SERPL-MCNC: 9.1 MG/DL
CANCER AG125 SERPL-ACNC: 12 U/ML
CEA SERPL-MCNC: 1.4 NG/ML
CHLORIDE SERPL-SCNC: 102 MMOL/L
CO2 SERPL-SCNC: 30 MMOL/L
COLOR: NORMAL
CREAT SERPL-MCNC: 0.71 MG/DL
GLUCOSE QUALITATIVE U: NEGATIVE
GLUCOSE SERPL-MCNC: 93 MG/DL
INR PPP: 1.17 RATIO
KETONES URINE: NEGATIVE
LEUKOCYTE ESTERASE URINE: NEGATIVE
MAGNESIUM SERPL-MCNC: 1.5 MG/DL
NITRITE URINE: NEGATIVE
PH URINE: 5.5
POTASSIUM SERPL-SCNC: 4.4 MMOL/L
PROT SERPL-MCNC: 6.9 G/DL
PROTEIN URINE: NEGATIVE
PT BLD: 13.7 SEC
SODIUM SERPL-SCNC: 142 MMOL/L
SPECIFIC GRAVITY URINE: 1.02
UROBILINOGEN URINE: NORMAL

## 2020-11-27 DIAGNOSIS — Z01.818 ENCOUNTER FOR OTHER PREPROCEDURAL EXAMINATION: ICD-10-CM

## 2020-12-04 ENCOUNTER — APPOINTMENT (OUTPATIENT)
Dept: DISASTER EMERGENCY | Facility: CLINIC | Age: 70
End: 2020-12-04

## 2020-12-05 LAB — SARS-COV-2 N GENE NPH QL NAA+PROBE: NOT DETECTED

## 2020-12-07 ENCOUNTER — RESULT REVIEW (OUTPATIENT)
Age: 70
End: 2020-12-07

## 2020-12-07 ENCOUNTER — APPOINTMENT (OUTPATIENT)
Dept: ENDOVASCULAR SURGERY | Facility: CLINIC | Age: 70
End: 2020-12-07
Payer: MEDICARE

## 2020-12-07 VITALS
BODY MASS INDEX: 24.35 KG/M2 | SYSTOLIC BLOOD PRESSURE: 140 MMHG | HEIGHT: 58 IN | DIASTOLIC BLOOD PRESSURE: 79 MMHG | OXYGEN SATURATION: 99 % | RESPIRATION RATE: 18 BRPM | TEMPERATURE: 98.2 F | WEIGHT: 116 LBS | HEART RATE: 85 BPM

## 2020-12-07 PROCEDURE — 77001 FLUOROGUIDE FOR VEIN DEVICE: CPT

## 2020-12-07 PROCEDURE — 99213 OFFICE O/P EST LOW 20 MIN: CPT | Mod: 25

## 2020-12-07 PROCEDURE — 36590 REMOVAL TUNNELED CV CATH: CPT

## 2020-12-07 PROCEDURE — 99202 OFFICE O/P NEW SF 15 MIN: CPT | Mod: 25

## 2020-12-07 NOTE — PAST MEDICAL HISTORY
[Increasing age ( >40 years old)] : Increasing age ( >40 years old) [No therapy indicated for cases scheduled for less than one hour] : No therapy indicated for cases scheduled for less than one hour. [FreeTextEntry1] : Malignant Hyperthermia Screening Tool and Risk of Bleeding Assessment \par \par Ms. ADRIEL ANDRADE denies family of unexpected death following Anesthesia or Exercise.\par Denies Family history of Malignant Hyperthermia, Muscle or Neuromuscular disorder and High Temperature  following exercise.\par \par Ms. ADRIEL ANDRADE denies history of Muscle Spasm, Dark or Chocolate- Colored and Unanticipated fever immediately following anaesthesia or serious exercise.\par Ms. ADRIEL ANDRADE also denies bleeding tendencies/Risks of Bleeding.\par

## 2020-12-07 NOTE — HISTORY OF PRESENT ILLNESS
[FreeTextEntry1] : alert and oriented x 3 \par accompanied by Promise [id number]\par no reported falls \par covid test negative \par took blood pressure medicine today \par  [FreeTextEntry5] : yesterday at 8 pm [FreeTextEntry6] : Dr Austin

## 2020-12-07 NOTE — PROCEDURE
[D/C IV on discharge] : D/C IV on discharge [Resume diet] : resume diet [Site check for bleeding/hematoma] : Site check for bleeding/hematoma [Vital signs on admission the q 15 mins x2] : Vital signs on admission the q 15 mins x2 [FreeTextEntry1] : right mediport removal

## 2020-12-07 NOTE — ASSESSMENT
[FreeTextEntry1] : Pt with endometrial ca with completion of therapy for port removal.\par \par Port removed without incident.  EBL=minimal.  Full report to follow.

## 2020-12-10 NOTE — H&P PST ADULT - OTHER CARE PROVIDERS
Gen: AAOx3, non-toxic  Head: NCAT  HEENT: EOMI, oral mucosa moist, normal conjunctiva  Lung: CTAB, no respiratory distress, no wheezes/rhonchi/rales B/L, speaking in full sentences  CV: RRR, no murmurs, rubs or gallops  Abd: soft, NTND, no guarding, no CVA tenderness  MSK: no visible deformities  Neuro: No focal sensory or motor deficits, normal CN exam   Skin: Left AV fistula on the AC area, appears intact.   Psych: normal affect.
PCP

## 2020-12-18 ENCOUNTER — TRANSCRIPTION ENCOUNTER (OUTPATIENT)
Age: 70
End: 2020-12-18

## 2020-12-18 ENCOUNTER — OUTPATIENT (OUTPATIENT)
Dept: OUTPATIENT SERVICES | Facility: HOSPITAL | Age: 70
LOS: 1 days | Discharge: HOME | End: 2020-12-18

## 2020-12-18 DIAGNOSIS — C54.1 MALIGNANT NEOPLASM OF ENDOMETRIUM: ICD-10-CM

## 2020-12-18 DIAGNOSIS — Z87.39 PERSONAL HISTORY OF OTHER DISEASES OF THE MUSCULOSKELETAL SYSTEM AND CONNECTIVE TISSUE: Chronic | ICD-10-CM

## 2020-12-18 DIAGNOSIS — Z90.710 ACQUIRED ABSENCE OF BOTH CERVIX AND UTERUS: Chronic | ICD-10-CM

## 2020-12-18 DIAGNOSIS — Z98.890 OTHER SPECIFIED POSTPROCEDURAL STATES: Chronic | ICD-10-CM

## 2020-12-18 DIAGNOSIS — N60.12 DIFFUSE CYSTIC MASTOPATHY OF LEFT BREAST: Chronic | ICD-10-CM

## 2020-12-22 ENCOUNTER — RESULT REVIEW (OUTPATIENT)
Age: 70
End: 2020-12-22

## 2020-12-22 ENCOUNTER — APPOINTMENT (OUTPATIENT)
Dept: HEMATOLOGY ONCOLOGY | Facility: CLINIC | Age: 70
End: 2020-12-22

## 2020-12-22 LAB
BASOPHILS # BLD AUTO: 0.01 K/UL — SIGNIFICANT CHANGE UP (ref 0–0.2)
BASOPHILS NFR BLD AUTO: 0.5 % — SIGNIFICANT CHANGE UP (ref 0–2)
EOSINOPHIL # BLD AUTO: 0.02 K/UL — SIGNIFICANT CHANGE UP (ref 0–0.5)
EOSINOPHIL NFR BLD AUTO: 0.9 % — SIGNIFICANT CHANGE UP (ref 0–6)
HCT VFR BLD CALC: 35.6 % — SIGNIFICANT CHANGE UP (ref 34.5–45)
HGB BLD-MCNC: 10.9 G/DL — LOW (ref 11.5–15.5)
IMM GRANULOCYTES NFR BLD AUTO: 0.5 % — SIGNIFICANT CHANGE UP (ref 0–1.5)
LYMPHOCYTES # BLD AUTO: 0.51 K/UL — LOW (ref 1–3.3)
LYMPHOCYTES # BLD AUTO: 23.1 % — SIGNIFICANT CHANGE UP (ref 13–44)
MCHC RBC-ENTMCNC: 28.1 PG — SIGNIFICANT CHANGE UP (ref 27–34)
MCHC RBC-ENTMCNC: 30.6 G/DL — LOW (ref 32–36)
MCV RBC AUTO: 91.8 FL — SIGNIFICANT CHANGE UP (ref 80–100)
MONOCYTES # BLD AUTO: 0.33 K/UL — SIGNIFICANT CHANGE UP (ref 0–0.9)
MONOCYTES NFR BLD AUTO: 14.9 % — HIGH (ref 2–14)
NEUTROPHILS # BLD AUTO: 1.33 K/UL — LOW (ref 1.8–7.4)
NEUTROPHILS NFR BLD AUTO: 60.1 % — SIGNIFICANT CHANGE UP (ref 43–77)
NRBC # BLD: 0 /100 WBCS — SIGNIFICANT CHANGE UP (ref 0–0)
PLATELET # BLD AUTO: 203 K/UL — SIGNIFICANT CHANGE UP (ref 150–400)
RBC # BLD: 3.88 M/UL — SIGNIFICANT CHANGE UP (ref 3.8–5.2)
RBC # FLD: 15.4 % — HIGH (ref 10.3–14.5)
WBC # BLD: 2.21 K/UL — LOW (ref 3.8–10.5)
WBC # FLD AUTO: 2.21 K/UL — LOW (ref 3.8–10.5)

## 2020-12-29 LAB
ALBUMIN SERPL ELPH-MCNC: 4.2 G/DL
ALP BLD-CCNC: 105 U/L
ALT SERPL-CCNC: 15 U/L
ANION GAP SERPL CALC-SCNC: 11 MMOL/L
AST SERPL-CCNC: 24 U/L
BILIRUB SERPL-MCNC: 0.5 MG/DL
BUN SERPL-MCNC: 19 MG/DL
CALCIUM SERPL-MCNC: 9.2 MG/DL
CANCER AG125 SERPL-ACNC: 10 U/ML
CHLORIDE SERPL-SCNC: 105 MMOL/L
CO2 SERPL-SCNC: 28 MMOL/L
CREAT SERPL-MCNC: 0.78 MG/DL
GLUCOSE SERPL-MCNC: 94 MG/DL
MAGNESIUM SERPL-MCNC: 1.4 MG/DL
POTASSIUM SERPL-SCNC: 4.3 MMOL/L
PROT SERPL-MCNC: 6.9 G/DL
SODIUM SERPL-SCNC: 144 MMOL/L

## 2020-12-29 RX ORDER — CALCIUM CARBONATE/VITAMIN D3 500-10/5ML
400 LIQUID (ML) ORAL
Qty: 14 | Refills: 0 | Status: ACTIVE | COMMUNITY
Start: 2020-09-21 | End: 1900-01-01

## 2021-01-15 ENCOUNTER — OUTPATIENT (OUTPATIENT)
Dept: OUTPATIENT SERVICES | Facility: HOSPITAL | Age: 71
LOS: 1 days | Discharge: HOME | End: 2021-01-15

## 2021-01-15 DIAGNOSIS — N60.12 DIFFUSE CYSTIC MASTOPATHY OF LEFT BREAST: Chronic | ICD-10-CM

## 2021-01-15 DIAGNOSIS — Z98.890 OTHER SPECIFIED POSTPROCEDURAL STATES: Chronic | ICD-10-CM

## 2021-01-15 DIAGNOSIS — C54.1 MALIGNANT NEOPLASM OF ENDOMETRIUM: ICD-10-CM

## 2021-01-15 DIAGNOSIS — Z90.710 ACQUIRED ABSENCE OF BOTH CERVIX AND UTERUS: Chronic | ICD-10-CM

## 2021-01-15 DIAGNOSIS — Z87.39 PERSONAL HISTORY OF OTHER DISEASES OF THE MUSCULOSKELETAL SYSTEM AND CONNECTIVE TISSUE: Chronic | ICD-10-CM

## 2021-01-21 ENCOUNTER — APPOINTMENT (OUTPATIENT)
Dept: HEMATOLOGY ONCOLOGY | Facility: CLINIC | Age: 71
End: 2021-01-21
Payer: MEDICARE

## 2021-01-21 PROCEDURE — 99214 OFFICE O/P EST MOD 30 MIN: CPT

## 2021-01-22 ENCOUNTER — LABORATORY RESULT (OUTPATIENT)
Age: 71
End: 2021-01-22

## 2021-01-24 LAB
ALBUMIN SERPL ELPH-MCNC: 4.2 G/DL
ALP BLD-CCNC: 109 U/L
ALT SERPL-CCNC: 9 U/L
ANION GAP SERPL CALC-SCNC: 15 MMOL/L
AST SERPL-CCNC: 20 U/L
BASOPHILS # BLD AUTO: 0.02 K/UL
BASOPHILS NFR BLD AUTO: 0.9 %
BILIRUB SERPL-MCNC: 0.4 MG/DL
BUN SERPL-MCNC: 16 MG/DL
CALCIUM SERPL-MCNC: 9.5 MG/DL
CANCER AG125 SERPL-ACNC: 11 U/ML
CHLORIDE SERPL-SCNC: 102 MMOL/L
CO2 SERPL-SCNC: 25 MMOL/L
CREAT SERPL-MCNC: 0.88 MG/DL
EOSINOPHIL # BLD AUTO: 0.03 K/UL
EOSINOPHIL NFR BLD AUTO: 1.7 %
GLUCOSE SERPL-MCNC: 97 MG/DL
HCT VFR BLD CALC: 40.3 %
HGB BLD-MCNC: 11.9 G/DL
LYMPHOCYTES # BLD AUTO: 0.39 K/UL
LYMPHOCYTES NFR BLD AUTO: 20.2 %
MAGNESIUM SERPL-MCNC: 1.7 MG/DL
MAN DIFF?: NORMAL
MCHC RBC-ENTMCNC: 28.1 PG
MCHC RBC-ENTMCNC: 29.5 GM/DL
MCV RBC AUTO: 95.3 FL
MONOCYTES # BLD AUTO: 0.2 K/UL
MONOCYTES NFR BLD AUTO: 10.5 %
NEUTROPHILS # BLD AUTO: 1.2 K/UL
NEUTROPHILS NFR BLD AUTO: 62.3 %
PLATELET # BLD AUTO: 216 K/UL
POTASSIUM SERPL-SCNC: 4.5 MMOL/L
PROT SERPL-MCNC: 7.2 G/DL
RBC # BLD: 4.23 M/UL
RBC # FLD: 15.5 %
SODIUM SERPL-SCNC: 141 MMOL/L
WBC # FLD AUTO: 1.93 K/UL

## 2021-02-03 NOTE — ASSESSMENT
[Curative] : Goals of care discussed with patient: Curative [FreeTextEntry1] : 68 y/o F with recently diagnosed stage IIIC [id number] grade 1 (well differentiated, favor endometrioid) endometrial carcinoma involving b/l pelvic nodes with 99% myometrial invasion and involvement of cervical stroma, +LVI, s/p PITO/BSO and pelvic LN dissection. Patient is currently undergoing adjuvant sandwich chemotherapy-RT therapy. She has completed EBRT directed to the pelvic nodes with VBT boost with Dr. Wright on 5/14. \par \par Patient is s/p 6 cycles carbo/taxol with radiation between C3 and C4 per sandwich protocol. She has tolerated chemotherapy well overall and is doing well clinically. We discussed the surveillance plans going forward. We also reviewed signs and symptoms of disease recurrence and she demonstrated good understanding of all that was presented.\par \par Post-treatment CT scans 8/5/20 reviewed and c/w no evidence of metastatic disease. \par \par Plan:\par -f/u 3 months in clinic \par -f/u Dr. Wright rad onc for post-RT care \par -f/u Dr. Terrazas - gyn onc\par -RTC every 3 months for surveillance monitoring \par -discussed HCM including mammogram, for which patient will follow up with breast surgery for f/u of R breast biopsy that showed calcifications \par \par All of the patient's questions and concerns were addressed in full. She knows to call my office should there be any new concerns or symptoms. \par \par

## 2021-02-03 NOTE — PHYSICAL EXAM
[Fully active, able to carry on all pre-disease performance without restriction] : Status 0 - Fully active, able to carry on all pre-disease performance without restriction [Normal] : affect appropriate [de-identified] : Normal respiratory effort and regular breaths

## 2021-02-08 ENCOUNTER — NON-APPOINTMENT (OUTPATIENT)
Age: 71
End: 2021-02-08

## 2021-02-19 ENCOUNTER — OUTPATIENT (OUTPATIENT)
Dept: OUTPATIENT SERVICES | Facility: HOSPITAL | Age: 71
LOS: 1 days | Discharge: HOME | End: 2021-02-19

## 2021-02-19 DIAGNOSIS — C54.1 MALIGNANT NEOPLASM OF ENDOMETRIUM: ICD-10-CM

## 2021-02-19 DIAGNOSIS — N60.12 DIFFUSE CYSTIC MASTOPATHY OF LEFT BREAST: Chronic | ICD-10-CM

## 2021-02-19 DIAGNOSIS — Z87.39 PERSONAL HISTORY OF OTHER DISEASES OF THE MUSCULOSKELETAL SYSTEM AND CONNECTIVE TISSUE: Chronic | ICD-10-CM

## 2021-02-19 DIAGNOSIS — Z98.890 OTHER SPECIFIED POSTPROCEDURAL STATES: Chronic | ICD-10-CM

## 2021-02-19 DIAGNOSIS — Z90.710 ACQUIRED ABSENCE OF BOTH CERVIX AND UTERUS: Chronic | ICD-10-CM

## 2021-02-22 ENCOUNTER — APPOINTMENT (OUTPATIENT)
Dept: HEMATOLOGY ONCOLOGY | Facility: CLINIC | Age: 71
End: 2021-02-22

## 2021-03-08 ENCOUNTER — APPOINTMENT (OUTPATIENT)
Dept: GYNECOLOGIC ONCOLOGY | Facility: CLINIC | Age: 71
End: 2021-03-08
Payer: MEDICARE

## 2021-03-08 ENCOUNTER — RESULT REVIEW (OUTPATIENT)
Age: 71
End: 2021-03-08

## 2021-03-08 VITALS
WEIGHT: 114 LBS | SYSTOLIC BLOOD PRESSURE: 144 MMHG | DIASTOLIC BLOOD PRESSURE: 88 MMHG | HEIGHT: 58 IN | BODY MASS INDEX: 23.93 KG/M2 | HEART RATE: 65 BPM

## 2021-03-08 PROCEDURE — 99214 OFFICE O/P EST MOD 30 MIN: CPT

## 2021-03-09 LAB
BUN SERPL-MCNC: 17 MG/DL
CANCER AG125 SERPL-ACNC: 13 U/ML
CREAT SERPL-MCNC: 0.76 MG/DL

## 2021-03-16 ENCOUNTER — OUTPATIENT (OUTPATIENT)
Dept: OUTPATIENT SERVICES | Facility: HOSPITAL | Age: 71
LOS: 1 days | End: 2021-03-16
Payer: MEDICARE

## 2021-03-16 ENCOUNTER — APPOINTMENT (OUTPATIENT)
Dept: CT IMAGING | Facility: IMAGING CENTER | Age: 71
End: 2021-03-16
Payer: MEDICARE

## 2021-03-16 DIAGNOSIS — C54.1 MALIGNANT NEOPLASM OF ENDOMETRIUM: ICD-10-CM

## 2021-03-16 DIAGNOSIS — Z98.890 OTHER SPECIFIED POSTPROCEDURAL STATES: Chronic | ICD-10-CM

## 2021-03-16 DIAGNOSIS — Z87.39 PERSONAL HISTORY OF OTHER DISEASES OF THE MUSCULOSKELETAL SYSTEM AND CONNECTIVE TISSUE: Chronic | ICD-10-CM

## 2021-03-16 DIAGNOSIS — N60.12 DIFFUSE CYSTIC MASTOPATHY OF LEFT BREAST: Chronic | ICD-10-CM

## 2021-03-16 DIAGNOSIS — Z90.710 ACQUIRED ABSENCE OF BOTH CERVIX AND UTERUS: Chronic | ICD-10-CM

## 2021-03-16 PROCEDURE — G1004: CPT

## 2021-03-16 PROCEDURE — 74177 CT ABD & PELVIS W/CONTRAST: CPT | Mod: 26,ME

## 2021-03-16 PROCEDURE — 74177 CT ABD & PELVIS W/CONTRAST: CPT

## 2021-03-22 ENCOUNTER — APPOINTMENT (OUTPATIENT)
Dept: CT IMAGING | Facility: IMAGING CENTER | Age: 71
End: 2021-03-22
Payer: MEDICARE

## 2021-03-22 ENCOUNTER — OUTPATIENT (OUTPATIENT)
Dept: OUTPATIENT SERVICES | Facility: HOSPITAL | Age: 71
LOS: 1 days | End: 2021-03-22
Payer: MEDICARE

## 2021-03-22 DIAGNOSIS — Z98.890 OTHER SPECIFIED POSTPROCEDURAL STATES: Chronic | ICD-10-CM

## 2021-03-22 DIAGNOSIS — N60.12 DIFFUSE CYSTIC MASTOPATHY OF LEFT BREAST: Chronic | ICD-10-CM

## 2021-03-22 DIAGNOSIS — Z90.710 ACQUIRED ABSENCE OF BOTH CERVIX AND UTERUS: Chronic | ICD-10-CM

## 2021-03-22 DIAGNOSIS — Z87.39 PERSONAL HISTORY OF OTHER DISEASES OF THE MUSCULOSKELETAL SYSTEM AND CONNECTIVE TISSUE: Chronic | ICD-10-CM

## 2021-03-22 DIAGNOSIS — C54.1 MALIGNANT NEOPLASM OF ENDOMETRIUM: ICD-10-CM

## 2021-03-22 PROCEDURE — 71250 CT THORAX DX C-: CPT | Mod: 26,ME

## 2021-03-22 PROCEDURE — G1004: CPT

## 2021-03-22 PROCEDURE — 71250 CT THORAX DX C-: CPT

## 2021-03-31 ENCOUNTER — APPOINTMENT (OUTPATIENT)
Dept: PULMONOLOGY | Facility: CLINIC | Age: 71
End: 2021-03-31

## 2021-04-05 ENCOUNTER — APPOINTMENT (OUTPATIENT)
Dept: SURGERY | Facility: CLINIC | Age: 71
End: 2021-04-05
Payer: MEDICARE

## 2021-04-05 VITALS
SYSTOLIC BLOOD PRESSURE: 148 MMHG | WEIGHT: 114 LBS | HEART RATE: 88 BPM | HEIGHT: 58 IN | TEMPERATURE: 97.9 F | BODY MASS INDEX: 23.93 KG/M2 | OXYGEN SATURATION: 99 % | DIASTOLIC BLOOD PRESSURE: 81 MMHG

## 2021-04-05 DIAGNOSIS — N63.20 UNSPECIFIED LUMP IN THE LEFT BREAST, UNSPECIFIED QUADRANT: ICD-10-CM

## 2021-04-05 PROCEDURE — 99213 OFFICE O/P EST LOW 20 MIN: CPT

## 2021-04-05 NOTE — PHYSICAL EXAM
[de-identified] : left breast scar with retraction. No chest deformity, asymmetry. Normal contours. No nodules, masses, tenderness, or axillary adenopathy. No nipple discharge

## 2021-04-05 NOTE — HISTORY OF PRESENT ILLNESS
[de-identified] : This is  a 71 year   old patient presenting today for a breast exam and to discuss the results of her recent  breast imaging. Patient had a BL breast US and   BL breast Mammogram on 02/18/2021 Deemed BIRADS category 4.  Bilateral indeterminate mammographic calcifications.   Patient had right breast Mammogram on 08/28/2019 Deemed BIRADS 4 category and was advised to have Stereotactic biopsy.  Ms. ANDRADE denies any trauma and she has no nipple discharge. Patient denies any fever, night sweats or loss of appetite.    Patient is on no hormonal replacement therapy.  There is  family history of breast carcinoma in her mother in her 80s [de-identified] : Patient states she received treatment  for cervical CA.  She had hysterectomy , chemo and RT.

## 2021-04-05 NOTE — PLAN
[FreeTextEntry1] : Ms. ANDRADE  is presenting  today for an evaluation .  she is doing well and offers no complaints.  Results of  her recent  imaging and physical examination findings were discussed in details.   She  was advised to have BL              breast  stereotactic biopsy and return after the tests. Importance of monthly self-breast examination was reinforced.  Patient's questions and concerns addressed to patient's satisfaction.\par

## 2021-04-05 NOTE — ASSESSMENT
[FreeTextEntry1] : Impression: BL breast calcifications - benign Physical exam ,  Abnormal Mammogram

## 2021-04-15 ENCOUNTER — APPOINTMENT (OUTPATIENT)
Dept: SURGERY | Facility: CLINIC | Age: 71
End: 2021-04-15

## 2021-05-22 NOTE — ASU PREOP CHECKLIST - AICD PRESENT
Pt instructed on ileostomy diet. Provided printed information from Nutrition Care Manual with RD contact information for follow up questions or concerns. Gelatein and Ensure max supplements ordered. Will allow G2. No further nutrition intervention indicated at this time. RD available by consult. Will follow per policy.   no

## 2021-05-27 ENCOUNTER — APPOINTMENT (OUTPATIENT)
Dept: SURGERY | Facility: CLINIC | Age: 71
End: 2021-05-27
Payer: MEDICARE

## 2021-05-27 VITALS
SYSTOLIC BLOOD PRESSURE: 117 MMHG | HEIGHT: 58 IN | OXYGEN SATURATION: 99 % | DIASTOLIC BLOOD PRESSURE: 75 MMHG | HEART RATE: 76 BPM | BODY MASS INDEX: 23.93 KG/M2 | TEMPERATURE: 97.8 F | WEIGHT: 114 LBS

## 2021-05-27 DIAGNOSIS — N63.10 UNSPECIFIED LUMP IN THE RIGHT BREAST, UNSPECIFIED QUADRANT: ICD-10-CM

## 2021-05-27 PROCEDURE — 99213 OFFICE O/P EST LOW 20 MIN: CPT

## 2021-05-27 NOTE — DATA REVIEWED
[FreeTextEntry1] : Patient Name\par ADRIEL ANDRADE\par Date of Birth\par 1950\par Procedure\par MA STEREOTACTIC BIOPSY\par Study Date & Time\par 2021-04-22 11:36 AM\par Patient ID\par [id number]\par Accession Number\par [id number]\par Referring Physician\par CLIVE RECINOS\par Institution Name\par [facility]\par Report\par RADIOLOGY REPORT\par FINDINGS\par FINDING\par EXAM: BILATERAL STEREOTACTIC BIOPSY\par Clinical History: Stereotactic biopsy recommended on outside diagnostic mammogram for bilateral\par calcifications, in the right breast at 10-11:00, middle depth spanning 4 mm and in the left breast,\par 1-2:00, posterior depth spanning 5 mm.\par In reviewing the outside exam, queried right breast calcifications are actually at 9:00, in the\par outer breast at midposterior depth. Additional small coarse group of calcifications in the upper\par outer breast, 10-11:00 seems to be stable for more than one year and can be considered probably\par benign.\par COMPARISON: Bilateral diagnostic mammogram performed on 2/18/2021 at [facility]\par TECHNIQUE/PROCEDURE:\par Pertinent imaging studies and/or documents were reviewed prior to the procedure. The patient\par identification and procedure were verified. The patient's known allergies, current medications and\par adverse medication reactions were reviewed prior to the procedure. The procedure was explained to\par the patient including the benefits and alternatives. Procedural risks, including infection and\par bleeding, were reviewed. [id number] questions were answered and written informed consent was\par obtained.\par Site 1, right, 10-11:00:\par The patient's breast was positioned and stereotactic images were obtained, demonstrating the\par calcifications of concern.\par The breast was prepped for the procedure using standard aseptic technique. Local anesthesia was\par achieved with injection of 5 cc of 1% lidocaine in the skin/superficial tissues and 1% lidocaine\par with epinephrine in the deeper tissues. The calcifications were biopsied using a superior approach,\par stereotactic mammographic guidance, and a 9 gauge vacuum assisted device. 12 tissue samples were\par obtained.\par Radiograph of the specimen confirms presence of the targeted calcifications. Using standard\par technique, a mini barbell shaped marking clip was placed at the biopsy site.\par  \par Site 2, left, 1-2:00:\par The patient's breast was positioned and stereotactic images were obtained, demonstrating the\par calcifications of concern.\par The breast was prepped for the procedure using standard aseptic technique. Local anesthesia was\par achieved with injection of 5 cc of 1% lidocaine in the skin/superficial tissues and 1% lidocaine\par with epinephrine in the deeper tissues. The calcifications were biopsied using a lateral approach,\par stereotactic mammographic guidance, and a 9 gauge vacuum assisted device. 4 tissue samples were\par obtained.\par Radiograph of the specimen confirms presence of the targeted calcifications. Using standard\par technique, a mini barbell shaped marking clip was placed at the biopsy site.\par Postprocedure mammography demonstrates the position of the clips.\par The tissue was then sent to the pathologist for histologic analysis.\par The patient tolerated the procedure well without complication. The sites of biopsy were held in\par compression for several minutes and the needle entrance sites were closed using Steri strips. Post\par procedure instructions were reviewed with the patient. All patient questions were answered.\par IMPRESSION:\par Stereotactic core needle biopsy of bilateral calcifications with marker placement.\par PATHOLOGY RESULTS\par Diagnosis:\par Right common 9:00: Sclerosing adenosis, fibroadenomatoid changes and stromal fibrosis.\par Calcifications are present in benign breast tissue.\par Left: Fibroadenomatoid changes. Calcifications are present in benign breast tissue.\par Please see pathology report for additional details.\par Outcome: Benign\par Concordance: Histopathologic diagnosis is concordant with imaging.\par Recommendation:\par Bilateral diagnostic mammogram in 6 months.\par BI-RADS CATEGORY: 3 - Probably Benign\par Patient will need bilateral diagnostic mammogram in 6 months to document stability of additional\par probably benign calcifications if the biopsies are benign.\par Electronically signed by: Jakob Weller MD 4/27/2021 1:11 PM\par Workstation: [id number]\par Electronically Signed By: Jakob Weller MD\par 5/27/2021 [employer]\par [email or web address]\par Sign Date: 27-APR-21\[facility]

## 2021-05-27 NOTE — HISTORY OF PRESENT ILLNESS
[de-identified] :  This is a 71 year old patient presenting today for a breast exam and to discuss the results of her recent breast imaging. Patient had a BL breast US and BL breast Mammogram on 02/18/2021 Deemed BIRADS category 4. Bilateral indeterminate mammographic calcifications. Patient had right breast Mammogram on 08/28/2019 Deemed BIRADS 4 category and was advised to have Stereotactic biopsy. Ms. ANDRADE  is s/p BL breast  stereotactic biopsy on 04/22/2021. Patient's pathology results were     Benign and concordant.  concordant with imaging.Ms. ANDRADE denies any trauma and she has no nipple discharge. Patient denies any fever, night sweats or loss of appetite. Patient is on no hormonal replacement therapy. There is family history of breast carcinoma in her mother in her 80s. \par  [de-identified] : Patient reports no interval changes to her overall health status or medical history

## 2021-05-27 NOTE — PHYSICAL EXAM
[Alert] : alert [Oriented to Person] : oriented to person [Oriented to Place] : oriented to place [Oriented to Time] : oriented to time [Calm] : calm [de-identified] : She  is alert, well-groomed, and in NAD\par   [de-identified] : anicteric.  Nasal mucosa pink, septum midline. Oral mucosa pink.  Tongue midline, Pharynx without exudates.\par   [de-identified] : Neck supple. Trachea midline. Thyroid isthmus barely palpable, lobes not felt.\par   [de-identified] : left breast scar with retraction. No chest deformity, asymmetry. Normal contours. No nodules, masses, tenderness, or axillary adenopathy. No nipple discharge

## 2021-05-27 NOTE — PLAN
[FreeTextEntry1] : Ms. ANDRADE  is presenting  today for an evaluation .  she is doing well and offers no complaints.  Results of  her recent  imaging and physical examination findings were discussed in details.   She  was advised to have BL             Mammogram in  October and return after the tests. Importance of monthly self-breast examination was reinforced.  Patient's questions and concerns addressed to patient's satisfaction.\par \par

## 2021-08-23 NOTE — ASU PREOP CHECKLIST - PATIENT PROBLEMS/NEEDS
Dr. Mirella Mason - Tomorrow's virtual visit rescheduled for Saturday due to work schedule. Dr. Giovanni Quigley:  1. Okay to see in-office on Saturday 8/28/21 (in regards to respiratory symptoms)? Dr. Mirella Mason not in office on Saturday.    2. Do you want another COVID test that it's happening more frequently. Denies sour taste in mouth. Denies current chest pain. Reports bilateral arms sometimes become numb as well - denies weakness and inability to use arms.    Denies shortness of breath, difficulty breathing, jaw pain, Patient expressed no known problems or needs

## 2021-08-30 ENCOUNTER — NON-APPOINTMENT (OUTPATIENT)
Age: 71
End: 2021-08-30

## 2021-08-30 DIAGNOSIS — C54.1 MALIGNANT NEOPLASM OF ENDOMETRIUM: ICD-10-CM

## 2021-09-09 ENCOUNTER — APPOINTMENT (OUTPATIENT)
Dept: GYNECOLOGIC ONCOLOGY | Facility: CLINIC | Age: 71
End: 2021-09-09

## 2021-11-23 ENCOUNTER — INPATIENT (INPATIENT)
Facility: HOSPITAL | Age: 71
LOS: 0 days | Discharge: HOME | DRG: 379 | End: 2021-11-24
Attending: INTERNAL MEDICINE | Admitting: INTERNAL MEDICINE
Payer: COMMERCIAL

## 2021-11-23 VITALS
OXYGEN SATURATION: 100 % | DIASTOLIC BLOOD PRESSURE: 81 MMHG | SYSTOLIC BLOOD PRESSURE: 138 MMHG | HEIGHT: 59.5 IN | HEART RATE: 89 BPM | TEMPERATURE: 98 F | WEIGHT: 110.01 LBS | RESPIRATION RATE: 18 BRPM

## 2021-11-23 DIAGNOSIS — Z90.710 ACQUIRED ABSENCE OF BOTH CERVIX AND UTERUS: Chronic | ICD-10-CM

## 2021-11-23 DIAGNOSIS — K92.2 GASTROINTESTINAL HEMORRHAGE, UNSPECIFIED: ICD-10-CM

## 2021-11-23 DIAGNOSIS — Z29.9 ENCOUNTER FOR PROPHYLACTIC MEASURES, UNSPECIFIED: ICD-10-CM

## 2021-11-23 DIAGNOSIS — I10 ESSENTIAL (PRIMARY) HYPERTENSION: ICD-10-CM

## 2021-11-23 DIAGNOSIS — Z85.41 PERSONAL HISTORY OF MALIGNANT NEOPLASM OF CERVIX UTERI: ICD-10-CM

## 2021-11-23 DIAGNOSIS — Z98.890 OTHER SPECIFIED POSTPROCEDURAL STATES: Chronic | ICD-10-CM

## 2021-11-23 DIAGNOSIS — N60.12 DIFFUSE CYSTIC MASTOPATHY OF LEFT BREAST: Chronic | ICD-10-CM

## 2021-11-23 DIAGNOSIS — Z87.39 PERSONAL HISTORY OF OTHER DISEASES OF THE MUSCULOSKELETAL SYSTEM AND CONNECTIVE TISSUE: Chronic | ICD-10-CM

## 2021-11-23 LAB
ALBUMIN SERPL ELPH-MCNC: 3.2 G/DL — LOW (ref 3.5–5)
ALP SERPL-CCNC: 90 U/L — SIGNIFICANT CHANGE UP (ref 40–120)
ALT FLD-CCNC: 20 U/L DA — SIGNIFICANT CHANGE UP (ref 10–60)
ANION GAP SERPL CALC-SCNC: 6 MMOL/L — SIGNIFICANT CHANGE UP (ref 5–17)
APTT BLD: 30.4 SEC — SIGNIFICANT CHANGE UP (ref 27.5–35.5)
AST SERPL-CCNC: 19 U/L — SIGNIFICANT CHANGE UP (ref 10–40)
BASOPHILS # BLD AUTO: 0.01 K/UL — SIGNIFICANT CHANGE UP (ref 0–0.2)
BASOPHILS NFR BLD AUTO: 0.4 % — SIGNIFICANT CHANGE UP (ref 0–2)
BILIRUB SERPL-MCNC: 0.4 MG/DL — SIGNIFICANT CHANGE UP (ref 0.2–1.2)
BUN SERPL-MCNC: 15 MG/DL — SIGNIFICANT CHANGE UP (ref 7–18)
CALCIUM SERPL-MCNC: 9.1 MG/DL — SIGNIFICANT CHANGE UP (ref 8.4–10.5)
CHLORIDE SERPL-SCNC: 108 MMOL/L — SIGNIFICANT CHANGE UP (ref 96–108)
CO2 SERPL-SCNC: 30 MMOL/L — SIGNIFICANT CHANGE UP (ref 22–31)
CREAT SERPL-MCNC: 0.9 MG/DL — SIGNIFICANT CHANGE UP (ref 0.5–1.3)
EOSINOPHIL # BLD AUTO: 0.02 K/UL — SIGNIFICANT CHANGE UP (ref 0–0.5)
EOSINOPHIL NFR BLD AUTO: 0.8 % — SIGNIFICANT CHANGE UP (ref 0–6)
GLUCOSE SERPL-MCNC: 117 MG/DL — HIGH (ref 70–99)
HCT VFR BLD CALC: 39.1 % — SIGNIFICANT CHANGE UP (ref 34.5–45)
HGB BLD-MCNC: 12 G/DL — SIGNIFICANT CHANGE UP (ref 11.5–15.5)
IMM GRANULOCYTES NFR BLD AUTO: 0.4 % — SIGNIFICANT CHANGE UP (ref 0–1.5)
INR BLD: 1.08 RATIO — SIGNIFICANT CHANGE UP (ref 0.88–1.16)
LYMPHOCYTES # BLD AUTO: 0.47 K/UL — LOW (ref 1–3.3)
LYMPHOCYTES # BLD AUTO: 18.9 % — SIGNIFICANT CHANGE UP (ref 13–44)
MCHC RBC-ENTMCNC: 28.4 PG — SIGNIFICANT CHANGE UP (ref 27–34)
MCHC RBC-ENTMCNC: 30.7 GM/DL — LOW (ref 32–36)
MCV RBC AUTO: 92.4 FL — SIGNIFICANT CHANGE UP (ref 80–100)
MONOCYTES # BLD AUTO: 0.35 K/UL — SIGNIFICANT CHANGE UP (ref 0–0.9)
MONOCYTES NFR BLD AUTO: 14.1 % — HIGH (ref 2–14)
NEUTROPHILS # BLD AUTO: 1.63 K/UL — LOW (ref 1.8–7.4)
NEUTROPHILS NFR BLD AUTO: 65.4 % — SIGNIFICANT CHANGE UP (ref 43–77)
NRBC # BLD: 0 /100 WBCS — SIGNIFICANT CHANGE UP (ref 0–0)
OB PNL STL: POSITIVE
PLATELET # BLD AUTO: 207 K/UL — SIGNIFICANT CHANGE UP (ref 150–400)
POTASSIUM SERPL-MCNC: 4.1 MMOL/L — SIGNIFICANT CHANGE UP (ref 3.5–5.3)
POTASSIUM SERPL-SCNC: 4.1 MMOL/L — SIGNIFICANT CHANGE UP (ref 3.5–5.3)
PROT SERPL-MCNC: 7.2 G/DL — SIGNIFICANT CHANGE UP (ref 6–8.3)
PROTHROM AB SERPL-ACNC: 12.8 SEC — SIGNIFICANT CHANGE UP (ref 10.6–13.6)
RBC # BLD: 4.23 M/UL — SIGNIFICANT CHANGE UP (ref 3.8–5.2)
RBC # FLD: 13.4 % — SIGNIFICANT CHANGE UP (ref 10.3–14.5)
SARS-COV-2 RNA SPEC QL NAA+PROBE: SIGNIFICANT CHANGE UP
SODIUM SERPL-SCNC: 144 MMOL/L — SIGNIFICANT CHANGE UP (ref 135–145)
WBC # BLD: 2.49 K/UL — LOW (ref 3.8–10.5)
WBC # FLD AUTO: 2.49 K/UL — LOW (ref 3.8–10.5)

## 2021-11-23 PROCEDURE — 99285 EMERGENCY DEPT VISIT HI MDM: CPT

## 2021-11-23 RX ORDER — SODIUM CHLORIDE 9 MG/ML
1000 INJECTION INTRAMUSCULAR; INTRAVENOUS; SUBCUTANEOUS
Refills: 0 | Status: DISCONTINUED | OUTPATIENT
Start: 2021-11-23 | End: 2021-11-24

## 2021-11-23 RX ORDER — ATORVASTATIN CALCIUM 80 MG/1
10 TABLET, FILM COATED ORAL AT BEDTIME
Refills: 0 | Status: DISCONTINUED | OUTPATIENT
Start: 2021-11-23 | End: 2021-11-24

## 2021-11-23 RX ORDER — IOHEXOL 300 MG/ML
30 INJECTION, SOLUTION INTRAVENOUS ONCE
Refills: 0 | Status: COMPLETED | OUTPATIENT
Start: 2021-11-23 | End: 2021-11-23

## 2021-11-23 RX ORDER — SODIUM CHLORIDE 9 MG/ML
1000 INJECTION, SOLUTION INTRAVENOUS ONCE
Refills: 0 | Status: COMPLETED | OUTPATIENT
Start: 2021-11-23 | End: 2021-11-23

## 2021-11-23 RX ORDER — PANTOPRAZOLE SODIUM 20 MG/1
40 TABLET, DELAYED RELEASE ORAL ONCE
Refills: 0 | Status: COMPLETED | OUTPATIENT
Start: 2021-11-23 | End: 2021-11-23

## 2021-11-23 RX ORDER — PANTOPRAZOLE SODIUM 20 MG/1
40 TABLET, DELAYED RELEASE ORAL DAILY
Refills: 0 | Status: DISCONTINUED | OUTPATIENT
Start: 2021-11-24 | End: 2021-11-24

## 2021-11-23 RX ADMIN — SODIUM CHLORIDE 85 MILLILITER(S): 9 INJECTION INTRAMUSCULAR; INTRAVENOUS; SUBCUTANEOUS at 23:23

## 2021-11-23 RX ADMIN — PANTOPRAZOLE SODIUM 40 MILLIGRAM(S): 20 TABLET, DELAYED RELEASE ORAL at 18:47

## 2021-11-23 RX ADMIN — ATORVASTATIN CALCIUM 10 MILLIGRAM(S): 80 TABLET, FILM COATED ORAL at 23:22

## 2021-11-23 RX ADMIN — IOHEXOL 30 MILLILITER(S): 300 INJECTION, SOLUTION INTRAVENOUS at 23:22

## 2021-11-23 RX ADMIN — SODIUM CHLORIDE 1000 MILLILITER(S): 9 INJECTION, SOLUTION INTRAVENOUS at 16:17

## 2021-11-23 NOTE — H&P ADULT - ASSESSMENT
72 yo F w/ Hx Endometrial cancer 10/1/19 s/p PITO, pelvic EBRT and vaginal brachytherapy (completed 5/14/2020), Carboplatin/Taxol x 3 (completed 6/20/2020), HTN and HLD who is p/w complaints of blood in stools. Admitted for GIB. Consulted GI Dr. Marquez. CT a/p w/ IV and PO contrast pending.

## 2021-11-23 NOTE — H&P ADULT - PROBLEM SELECTOR PLAN 1
p/w 1 day of 2 bloody stools, one episode of clots.   denies pain or fever  Hb 12.0 on admission  - given IV PPI one dose in ED  - 1L LR bolus in ED to hydrate in anticipation of contrast  - NPO  - f/u CT a/p with IV and PO contrast  - report results to Dr. Marquez, may do flex sig on 11/24  GI Dr. Marquez consulted

## 2021-11-23 NOTE — H&P ADULT - NSHPREVIEWOFSYSTEMS_GEN_ALL_CORE
REVIEW OF SYSTEMS:  CONSTITUTIONAL: No fever, weight loss, or fatigue  RESPIRATORY: No cough, wheezing, chills or hemoptysis; No shortness of breath  CARDIOVASCULAR: No chest pain, palpitations, dizziness, or leg swelling  GASTROINTESTINAL: No abdominal pain. No nausea, vomiting, or hematemesis; No diarrhea or constipation. No melena or hematochezia.  GENITOURINARY: No dysuria or hematuria, urinary frequency  NEUROLOGICAL: No headaches, memory loss, loss of strength, numbness, or tremors  SKIN: No itching, burning, rashes, or lesions REVIEW OF SYSTEMS:  CONSTITUTIONAL: No fever,  or fatigue + weight loss, + lack of appetite   RESPIRATORY: No cough, wheezing, chills or hemoptysis; No shortness of breath  CARDIOVASCULAR: No chest pain, palpitations, dizziness, or leg swelling  GASTROINTESTINAL: No abdominal pain. No nausea, vomiting, or hematemesis; No diarrhea or constipation. No melena + hematochezia + bloating + gas  GENITOURINARY: No dysuria or hematuria, urinary frequency  NEUROLOGICAL: No headaches, memory loss, loss of strength, numbness, or tremors  SKIN: No itching, burning, rashes, or lesions

## 2021-11-23 NOTE — H&P ADULT - HISTORY OF PRESENT ILLNESS
70 yo F w/ Hx IIIC1 gr1 endometrial cancer 10/1/19 s/p PITO, pelvic EBRT and vaginal brachytherapy (completed 5/14/2020), Carboplatin/Taxol x 3 (completed 6/20/2020), HTN and HLD who is p/w complaints of blood in stools. Ms. Barakat states that since her radiotherapy completed she ha suffered from abdominal bloating, gas and churning sensation in her lower bowels. She denies any changes in her stools prior to today when she noticed a sudden urge to defecate after eating a muffin and saw bright red blood intermixed in stool and on TP with wiping. Had a subsequent BM with small blood and some clots. Never had blood in stool prior. Denies any fevers, abdominal pain, stool changes. Has reported 20lb weight loss since completing cancer treatment. Able to tolerate diet normally, but has diminished appetite. Last colonoscopy 2016, has hx of hemorrhoids. CT C/A/P 8/2020 showed no evidence of disease.

## 2021-11-23 NOTE — H&P ADULT - NSICDXPASTMEDICALHX_GEN_ALL_CORE_FT
PAST MEDICAL HISTORY:  Benign neoplasm breast, left     Breast calcification seen on mammogram right breast    Essential hypertension     Fibroids     Hyperlipidemia, unspecified hyperlipidemia type     Neck pain Pt states last studies 2017    Sciatica of left side     Scoliosis, unspecified scoliosis type, unspecified spinal region

## 2021-11-23 NOTE — ED PROVIDER NOTE - GASTROINTESTINAL, MLM
Abdomen soft, non-tender, no guarding. Rectal exam performed revealing red blood mixed with stool and no visible hemorrhoids.

## 2021-11-23 NOTE — ED ADULT TRIAGE NOTE - BSA (M2)
She has a normal mood and affect. Vitals reviewed. ASSESSMENT:    1. Essential hypertension    2. Hypothyroidism due to acquired atrophy of thyroid    3. Depression with anxiety    4. Memory loss        PLAN:    Juan Lowery was seen today for 3 month follow-up. Diagnoses and all orders for this visit:    Essential hypertension - Blood pressure stable and will continue current regimen. Will plan periodic monitoring of renal function, electrolytes    -     Comprehensive Metabolic Panel; Future    Hypothyroidism due to acquired atrophy of thyroid- cont meds and f/u TFTs  -     TSH without Reflex; Future  -     T4, Free; Future    Depression with anxiety- stable on regimen, oarrs reviewed, rf rx and cont f/u w therapist Rhonda  -     clonazePAM (KLONOPIN) 0.5 MG tablet; Take 1 tablet by mouth 3 times daily for 30 days. Memory loss- incr dose back to prior 9.5mg and monitor  -     rivastigmine (EXELON) 9.5 MG/24HR;  Place 1 patch onto the skin daily 1.44

## 2021-11-23 NOTE — ED PROVIDER NOTE - OBJECTIVE STATEMENT
71 year old female with PMHx of hypertension, hyperlipidemia, sciatica, and fibroids presents to the ED with complaints of bloody stools today. Patient states that after she woke up this morning she made a bowel movement when she noted bright red blood including small clots in her stool. Patient reports that she does not have any history of similar episodes in the past. Patient denies all other acute complaints including any shortness of breath, lightheadedness, or pain. Patient endorses that her last colonoscopy was in 2016. Patient otherwise does not currently have a GI doctor. NKDA.

## 2021-11-23 NOTE — H&P ADULT - NSHPPHYSICALEXAM_GEN_ALL_CORE
PHYSICAL EXAMINATION:  GENERAL: NAD, well built  HEAD:  Atraumatic, Normocephalic  EYES:  conjunctiva and sclera clear  NECK: Supple, No JVD, Normal thyroid  CHEST/LUNG: Clear to auscultation. Clear to percussion bilaterally; No rales, rhonchi, wheezing, or rubs  HEART: Regular rate and rhythm; No murmurs, rubs, or gallops  ABDOMEN: Soft, Nontender, Nondistended; Bowel sounds present  NERVOUS SYSTEM:  Alert & Oriented X3,    EXTREMITIES:  2+ Peripheral Pulses, No clubbing, cyanosis, or edema  SKIN: warm dry PHYSICAL EXAMINATION:  GENERAL: NAD, thin, on RA  HEAD:  Atraumatic, Normocephalic  EYES:  conjunctiva and sclera clear  NECK: Supple, No JVD, Normal thyroid  CHEST/LUNG: Clear to auscultation. No rales, rhonchi, wheezing, or rubs  HEART: Regular rate and rhythm; No murmurs, rubs, or gallops  ABDOMEN: Soft, Nontender, Nondistended; Bowel sounds hyperactive  NERVOUS SYSTEM:  Alert & Oriented X3,    EXTREMITIES:  2+ Peripheral Pulses, No clubbing, cyanosis, or edema  SKIN: warm dry

## 2021-11-23 NOTE — PATIENT PROFILE ADULT - SAFE PLACE TO LIVE
[General Appearance - Alert] : alert [General Appearance - In No Acute Distress] : in no acute distress [Sclera] : the sclera and conjunctiva were normal [PERRL With Normal Accommodation] : pupils were equal in size, round, and reactive to light [Extraocular Movements] : extraocular movements were intact [Outer Ear] : the ears and nose were normal in appearance [Oropharynx] : the oropharynx was normal [Neck Appearance] : the appearance of the neck was normal [Neck Cervical Mass (___cm)] : no neck mass was observed [Jugular Venous Distention Increased] : there was no jugular-venous distention [Thyroid Diffuse Enlargement] : the thyroid was not enlarged [Thyroid Nodule] : there were no palpable thyroid nodules [Auscultation Breath Sounds / Voice Sounds] : lungs were clear to auscultation bilaterally [Heart Rate And Rhythm] : heart rate was normal and rhythm regular [Heart Sounds] : normal S1 and S2 [Heart Sounds Gallop] : no gallops [Murmurs] : no murmurs [Heart Sounds Pericardial Friction Rub] : no pericardial rub [Bowel Sounds] : normal bowel sounds [Abdomen Soft] : soft [Abdomen Tenderness] : non-tender [] : no hepato-splenomegaly [Abdomen Mass (___ Cm)] : no abdominal mass palpated [Internal Hemorrhoid] : internal hemorrhoids [External Hemorrhoid] : external hemorrhoids [No CVA Tenderness] : no ~M costovertebral angle tenderness [No Spinal Tenderness] : no spinal tenderness [Abnormal Walk] : normal gait [Nail Clubbing] : no clubbing  or cyanosis of the fingernails [Musculoskeletal - Swelling] : no joint swelling seen [Motor Tone] : muscle strength and tone were normal [Oriented To Time, Place, And Person] : oriented to person, place, and time [Impaired Insight] : insight and judgment were intact [Affect] : the affect was normal no

## 2021-11-23 NOTE — H&P ADULT - NSHPLABSRESULTS_GEN_ALL_CORE
LABS:                        12.0   2.49  )-----------( 207      ( 23 Nov 2021 12:21 )             39.1     11-23    144  |  108  |  15  ----------------------------<  117<H>  4.1   |  30  |  0.90    Ca    9.1      23 Nov 2021 12:21    TPro  7.2  /  Alb  3.2<L>  /  TBili  0.4  /  DBili  x   /  AST  19  /  ALT  20  /  AlkPhos  90  11-23    PT/INR - ( 23 Nov 2021 12:20 )   PT: 12.8 sec;   INR: 1.08 ratio         PTT - ( 23 Nov 2021 12:20 )  PTT:30.4 sec    LIVER FUNCTIONS - ( 23 Nov 2021 12:21 )  Alb: 3.2 g/dL / Pro: 7.2 g/dL / ALK PHOS: 90 U/L / ALT: 20 U/L DA / AST: 19 U/L / GGT: x LABS:                        12.0   2.49  )-----------( 207      ( 23 Nov 2021 12:21 )             39.1     11-23    144  |  108  |  15  ----------------------------<  117<H>  4.1   |  30  |  0.90    Ca    9.1      23 Nov 2021 12:21    TPro  7.2  /  Alb  3.2<L>  /  TBili  0.4  /  DBili  x   /  AST  19  /  ALT  20  /  AlkPhos  90  11-23    PT/INR - ( 23 Nov 2021 12:20 )   PT: 12.8 sec;   INR: 1.08 ratio      PTT - ( 23 Nov 2021 12:20 )  PTT:30.4 sec    LIVER FUNCTIONS - ( 23 Nov 2021 12:21 )  Alb: 3.2 g/dL / Pro: 7.2 g/dL / ALK PHOS: 90 U/L / ALT: 20 U/L DA / AST: 19 U/L / GGT: x

## 2021-11-23 NOTE — ED ADULT NURSE NOTE - NS ED NURSE RECORD ANOTHER VITAL SIGN
85yo F with PMHx of COPD(not on home o2 sat usually low 90s as per pt), CHF on Lasix, CAD s/p 4 stents on Plavix, HTN, DM type 2, hypothyroid p/w difficulty breathing and left leg pain, 10/10 x 1month, had multiple ER visit at [facility] for similar pain for which all workup was negative. Today woke up with more leg pain which has been chronic for past few month talking tramadol with some relieve but states Morphine helps with the pain. Pt also endorses to difficulty breathing this morning worse then her baseline with her oxygen level under 80s then called her son who told her to go to the ED. Denies fever, chills, cp, palpitations, cough, abdominal pain, dysuria, no sick contacts, no recent travel, difficulty walking. Patient was seen at [place] in May for uncontrolled DM and shortness of breath. Was then sent to [facility] for 3 weeks for rehab, currently  resides in [facility]. States she she has visiting nurse that comes one a week and due to pain she is unable to do her ADL.     In the ED vitals stable, labs pertinent for glucose 292, wbc 12.24, Cr/BUN 1.6/37, procalcitonin .08, CT chest w/o cont negative for pneumonia, Doppler LE b/l Negative for DVT, proBNP 343. Pt received 40mg prednisone x 1, Morphine x 2 for pain. Yes 85yo F with PMHx of COPD(not on home o2 sat usually low 90s as per pt), HFpEF on Lasix, CAD s/p 4 stents PCI on Plavix, 4/19/2018 she underwent cardiac cath that revealed patent stent in the LAD, 50% stenosis in the LCx (iFR normal) and 100% occlusion of the Mid RCA. LV function was normal and there was moderate AS, mild Pul, HTN, DM type 2, hypothyroid p/w difficulty breathing and left leg pain, 10/10 x 1month, had multiple ER visit at [facility] for similar pain for which all workup was negative. Today woke up with more leg pain which has been chronic for past few month talking tramadol with some relieve but states Morphine helps with the pain. Pt also endorses to difficulty breathing this morning worse then her baseline with her oxygen level under 80s then called her son who told her to go to the ED. Denies fever, chills, cp, palpitations, cough, abdominal pain, dysuria, no sick contacts, no recent travel, difficulty walking. Patient was seen at [place] in May for uncontrolled DM and shortness of breath. Was then sent to [facility] for 3 weeks for rehab, currently  resides in [facility]. States she she has visiting nurse that comes one a week and due to pain she is unable to do her ADL.     In the ED vitals stable, labs pertinent for glucose 292, wbc 12.24, Cr/BUN 1.6/37, procalcitonin .08, CT chest w/o cont negative for pneumonia, Doppler LE b/l Negative for DVT, proBNP 343. Pt received 40mg prednisone x 1, Morphine x 2 for pain. 83yo F with PMHx of COPD(not on home o2 sat usually low 90s as per pt), HFpEF on Lasix, CAD s/p 4 stents PCI on Plavix, 4/19/2018 she underwent cardiac cath that revealed patent stent in the LAD, 50% stenosis in the LCx (iFR normal) and 100% occlusion of the Mid RCA. LV function was normal and there was moderate AS, mild Pul, HTN, DM type 2, hypothyroid p/w difficulty breathing and left leg pain, 10/10 x 1month, had multiple ER visit at [facility] for similar pain for which all workup was negative. Today woke up with more leg pain which has been chronic for past few month talking tramadol with some relieve but states Morphine helps with the pain. Pt also endorses to difficulty breathing this morning worse then her baseline with her oxygen level under 80s then called her son who told her to go to the ED. Denies fever, chills, cp, palpitations, cough, abdominal pain, dysuria, no sick contacts, no recent travel, difficulty walking. Patient was seen at [place] in May for uncontrolled DM and shortness of breath. Was then sent to [facility] for 3 weeks for rehab, currently  resides in [facility]. States she she has visiting nurse that comes one a week and due to pain she is unable to do her ADL.     In the ED vitals stable, labs pertinent for glucose 292, wbc 12.24, Cr/BUN 1.6/37, procalcitonin .08, CT chest w/o cont negative for pneumonia, Doppler LE b/l Negative for DVT, proBNP 343. Pt received 40mg prednisone x 1, Morphine x 2 for pain. EKG showed sinus tachy 107, non specific T wave changes.

## 2021-11-23 NOTE — ED PROVIDER NOTE - NSICDXPASTSURGICALHX_GEN_ALL_CORE_FT
PAST SURGICAL HISTORY:  Ductal papillomatosis of breast, left s/p excision 1980's    H/O breast surgery right breast calcifications removed, breast marker placed over 20 years ago    H/O surgical biopsy left breast 7/2018    History of scoliosis s/p surgical repair  x 2 childhood    S/P hysterectomy [facility] 10/01/19

## 2021-11-23 NOTE — H&P ADULT - ATTENDING COMMENTS
gi bleed  serial cbc  gi consult  f/u CT    Advanced care planning was discussed with patient and family.  Advanced care planning forms were reviewed and discussed as appropriate.  Differential diagnosis and plan of care discussed with patient after the evaluation.   Pain assessed and judicious use of narcotics when appropriate was discussed.  Importance of Fall prevention discussed.  Counseling on Smoking and Alcohol cessation was offered when appropriate.  Counseling on Diet, exercise, and medication compliance was done.       Approx 60 minutes spent.

## 2021-11-23 NOTE — ED PROVIDER NOTE - CLINICAL SUMMARY MEDICAL DECISION MAKING FREE TEXT BOX
Given patient is of advanced age with complains of bright red blood per rectum and the fact that she does not have a GI doctor, will check basic blood work and admit patient for further evaluation. Given patient is of advanced age with complains of bright red blood per rectum and the fact that she does not have a GI doctor, had radiation to this area, will check basic blood work and admit patient for further evaluation.

## 2021-11-23 NOTE — H&P ADULT - NSICDXPASTSURGICALHX_GEN_ALL_CORE_FT
PAST SURGICAL HISTORY:  Ductal papillomatosis of breast, left s/p excision 1980's    H/O breast surgery right breast calcifications removed, breast marker placed over 20 years ago    H/O surgical biopsy left breast 7/2018    History of scoliosis s/p surgical repair  x 2 childhood    S/P hysterectomy [facility] 10/01/19

## 2021-11-24 ENCOUNTER — TRANSCRIPTION ENCOUNTER (OUTPATIENT)
Age: 71
End: 2021-11-24

## 2021-11-24 VITALS
RESPIRATION RATE: 18 BRPM | SYSTOLIC BLOOD PRESSURE: 128 MMHG | HEART RATE: 87 BPM | DIASTOLIC BLOOD PRESSURE: 76 MMHG | OXYGEN SATURATION: 100 % | TEMPERATURE: 98 F

## 2021-11-24 LAB
ALBUMIN SERPL ELPH-MCNC: 2.8 G/DL — LOW (ref 3.5–5)
ALP SERPL-CCNC: 77 U/L — SIGNIFICANT CHANGE UP (ref 40–120)
ALT FLD-CCNC: 18 U/L DA — SIGNIFICANT CHANGE UP (ref 10–60)
ANION GAP SERPL CALC-SCNC: 5 MMOL/L — SIGNIFICANT CHANGE UP (ref 5–17)
AST SERPL-CCNC: 19 U/L — SIGNIFICANT CHANGE UP (ref 10–40)
BASOPHILS # BLD AUTO: 0.01 K/UL — SIGNIFICANT CHANGE UP (ref 0–0.2)
BASOPHILS NFR BLD AUTO: 0.4 % — SIGNIFICANT CHANGE UP (ref 0–2)
BILIRUB SERPL-MCNC: 0.6 MG/DL — SIGNIFICANT CHANGE UP (ref 0.2–1.2)
BUN SERPL-MCNC: 13 MG/DL — SIGNIFICANT CHANGE UP (ref 7–18)
CALCIUM SERPL-MCNC: 8.8 MG/DL — SIGNIFICANT CHANGE UP (ref 8.4–10.5)
CHLORIDE SERPL-SCNC: 107 MMOL/L — SIGNIFICANT CHANGE UP (ref 96–108)
CO2 SERPL-SCNC: 29 MMOL/L — SIGNIFICANT CHANGE UP (ref 22–31)
COVID-19 NUCLEOCAPSID GAM AB INTERP: NEGATIVE — SIGNIFICANT CHANGE UP
COVID-19 NUCLEOCAPSID TOTAL GAM ANTIBODY RESULT: 0.07 INDEX — SIGNIFICANT CHANGE UP
COVID-19 SPIKE DOMAIN AB INTERP: POSITIVE
COVID-19 SPIKE DOMAIN ANTIBODY RESULT: 208 U/ML — HIGH
CREAT SERPL-MCNC: 0.8 MG/DL — SIGNIFICANT CHANGE UP (ref 0.5–1.3)
EOSINOPHIL # BLD AUTO: 0.05 K/UL — SIGNIFICANT CHANGE UP (ref 0–0.5)
EOSINOPHIL NFR BLD AUTO: 1.8 % — SIGNIFICANT CHANGE UP (ref 0–6)
ERYTHROCYTE [SEDIMENTATION RATE] IN BLOOD: 10 MM/HR — SIGNIFICANT CHANGE UP (ref 0–20)
FERRITIN SERPL-MCNC: 174 NG/ML — HIGH (ref 15–150)
GLUCOSE BLDC GLUCOMTR-MCNC: 134 MG/DL — HIGH (ref 70–99)
GLUCOSE BLDC GLUCOMTR-MCNC: 68 MG/DL — LOW (ref 70–99)
GLUCOSE SERPL-MCNC: 75 MG/DL — SIGNIFICANT CHANGE UP (ref 70–99)
HCT VFR BLD CALC: 34.4 % — LOW (ref 34.5–45)
HCV AB S/CO SERPL IA: 0.1 S/CO — SIGNIFICANT CHANGE UP (ref 0–0.99)
HCV AB SERPL-IMP: SIGNIFICANT CHANGE UP
HGB BLD-MCNC: 10.9 G/DL — LOW (ref 11.5–15.5)
IMM GRANULOCYTES NFR BLD AUTO: 0 % — SIGNIFICANT CHANGE UP (ref 0–1.5)
IRON SATN MFR SERPL: 32 % — SIGNIFICANT CHANGE UP (ref 15–50)
IRON SATN MFR SERPL: 73 UG/DL — SIGNIFICANT CHANGE UP (ref 40–150)
LYMPHOCYTES # BLD AUTO: 0.55 K/UL — LOW (ref 1–3.3)
LYMPHOCYTES # BLD AUTO: 19.3 % — SIGNIFICANT CHANGE UP (ref 13–44)
MAGNESIUM SERPL-MCNC: 1.3 MG/DL — LOW (ref 1.6–2.6)
MCHC RBC-ENTMCNC: 29.1 PG — SIGNIFICANT CHANGE UP (ref 27–34)
MCHC RBC-ENTMCNC: 31.7 GM/DL — LOW (ref 32–36)
MCV RBC AUTO: 92 FL — SIGNIFICANT CHANGE UP (ref 80–100)
MONOCYTES # BLD AUTO: 0.48 K/UL — SIGNIFICANT CHANGE UP (ref 0–0.9)
MONOCYTES NFR BLD AUTO: 16.8 % — HIGH (ref 2–14)
NEUTROPHILS # BLD AUTO: 1.76 K/UL — LOW (ref 1.8–7.4)
NEUTROPHILS NFR BLD AUTO: 61.7 % — SIGNIFICANT CHANGE UP (ref 43–77)
NRBC # BLD: 0 /100 WBCS — SIGNIFICANT CHANGE UP (ref 0–0)
PHOSPHATE SERPL-MCNC: 3.2 MG/DL — SIGNIFICANT CHANGE UP (ref 2.5–4.5)
PLATELET # BLD AUTO: 188 K/UL — SIGNIFICANT CHANGE UP (ref 150–400)
POTASSIUM SERPL-MCNC: 3.7 MMOL/L — SIGNIFICANT CHANGE UP (ref 3.5–5.3)
POTASSIUM SERPL-SCNC: 3.7 MMOL/L — SIGNIFICANT CHANGE UP (ref 3.5–5.3)
PROT SERPL-MCNC: 6.2 G/DL — SIGNIFICANT CHANGE UP (ref 6–8.3)
RBC # BLD: 3.74 M/UL — LOW (ref 3.8–5.2)
RBC # BLD: 3.74 M/UL — LOW (ref 3.8–5.2)
RBC # FLD: 13.4 % — SIGNIFICANT CHANGE UP (ref 10.3–14.5)
RETICS #: 57.2 K/UL — SIGNIFICANT CHANGE UP (ref 25–125)
RETICS/RBC NFR: 1.5 % — SIGNIFICANT CHANGE UP (ref 0.5–2.5)
SARS-COV-2 IGG+IGM SERPL QL IA: 0.07 INDEX — SIGNIFICANT CHANGE UP
SARS-COV-2 IGG+IGM SERPL QL IA: 208 U/ML — HIGH
SARS-COV-2 IGG+IGM SERPL QL IA: NEGATIVE — SIGNIFICANT CHANGE UP
SARS-COV-2 IGG+IGM SERPL QL IA: POSITIVE
SODIUM SERPL-SCNC: 141 MMOL/L — SIGNIFICANT CHANGE UP (ref 135–145)
TIBC SERPL-MCNC: 225 UG/DL — LOW (ref 250–450)
TRANSFERRIN SERPL-MCNC: 185 MG/DL — LOW (ref 200–360)
UIBC SERPL-MCNC: 152 UG/DL — SIGNIFICANT CHANGE UP (ref 110–370)
WBC # BLD: 2.85 K/UL — LOW (ref 3.8–10.5)
WBC # FLD AUTO: 2.85 K/UL — LOW (ref 3.8–10.5)

## 2021-11-24 PROCEDURE — 86900 BLOOD TYPING SEROLOGIC ABO: CPT

## 2021-11-24 PROCEDURE — 84100 ASSAY OF PHOSPHORUS: CPT

## 2021-11-24 PROCEDURE — 83550 IRON BINDING TEST: CPT

## 2021-11-24 PROCEDURE — 36415 COLL VENOUS BLD VENIPUNCTURE: CPT

## 2021-11-24 PROCEDURE — 85610 PROTHROMBIN TIME: CPT

## 2021-11-24 PROCEDURE — 80053 COMPREHEN METABOLIC PANEL: CPT

## 2021-11-24 PROCEDURE — 85652 RBC SED RATE AUTOMATED: CPT

## 2021-11-24 PROCEDURE — 74177 CT ABD & PELVIS W/CONTRAST: CPT

## 2021-11-24 PROCEDURE — 82728 ASSAY OF FERRITIN: CPT

## 2021-11-24 PROCEDURE — 86850 RBC ANTIBODY SCREEN: CPT

## 2021-11-24 PROCEDURE — 86769 SARS-COV-2 COVID-19 ANTIBODY: CPT

## 2021-11-24 PROCEDURE — 83540 ASSAY OF IRON: CPT

## 2021-11-24 PROCEDURE — 86901 BLOOD TYPING SEROLOGIC RH(D): CPT

## 2021-11-24 PROCEDURE — 85025 COMPLETE CBC W/AUTO DIFF WBC: CPT

## 2021-11-24 PROCEDURE — 85045 AUTOMATED RETICULOCYTE COUNT: CPT

## 2021-11-24 PROCEDURE — 99285 EMERGENCY DEPT VISIT HI MDM: CPT | Mod: 25

## 2021-11-24 PROCEDURE — 87635 SARS-COV-2 COVID-19 AMP PRB: CPT

## 2021-11-24 PROCEDURE — 83735 ASSAY OF MAGNESIUM: CPT

## 2021-11-24 PROCEDURE — 82962 GLUCOSE BLOOD TEST: CPT

## 2021-11-24 PROCEDURE — 74177 CT ABD & PELVIS W/CONTRAST: CPT | Mod: 26

## 2021-11-24 PROCEDURE — 84466 ASSAY OF TRANSFERRIN: CPT

## 2021-11-24 PROCEDURE — 85730 THROMBOPLASTIN TIME PARTIAL: CPT

## 2021-11-24 PROCEDURE — 86803 HEPATITIS C AB TEST: CPT

## 2021-11-24 PROCEDURE — 82272 OCCULT BLD FECES 1-3 TESTS: CPT

## 2021-11-24 RX ORDER — ATORVASTATIN CALCIUM 80 MG/1
1 TABLET, FILM COATED ORAL
Qty: 0 | Refills: 0 | DISCHARGE

## 2021-11-24 RX ORDER — MESALAMINE 400 MG
1 TABLET, DELAYED RELEASE (ENTERIC COATED) ORAL
Qty: 14 | Refills: 0
Start: 2021-11-24 | End: 2021-12-07

## 2021-11-24 RX ORDER — MAGNESIUM SULFATE 500 MG/ML
2 VIAL (ML) INJECTION ONCE
Refills: 0 | Status: COMPLETED | OUTPATIENT
Start: 2021-11-24 | End: 2021-11-24

## 2021-11-24 RX ORDER — PANTOPRAZOLE SODIUM 20 MG/1
1 TABLET, DELAYED RELEASE ORAL
Qty: 14 | Refills: 0
Start: 2021-11-24 | End: 2021-12-07

## 2021-11-24 RX ORDER — POTASSIUM CHLORIDE 20 MEQ
1 PACKET (EA) ORAL
Qty: 0 | Refills: 0 | DISCHARGE

## 2021-11-24 RX ORDER — MESALAMINE 400 MG
4 TABLET, DELAYED RELEASE (ENTERIC COATED) ORAL ONCE
Refills: 0 | Status: COMPLETED | OUTPATIENT
Start: 2021-11-24 | End: 2021-11-24

## 2021-11-24 RX ORDER — RAMIPRIL 5 MG
1 CAPSULE ORAL
Qty: 0 | Refills: 0 | DISCHARGE

## 2021-11-24 RX ADMIN — PANTOPRAZOLE SODIUM 40 MILLIGRAM(S): 20 TABLET, DELAYED RELEASE ORAL at 11:51

## 2021-11-24 RX ADMIN — Medication 50 GRAM(S): at 08:57

## 2021-11-24 RX ADMIN — Medication 4 GRAM(S): at 10:54

## 2021-11-24 NOTE — DISCHARGE NOTE PROVIDER - CARE PROVIDER_API CALL
Wesley Cardozo  Gastroenterology  [address] [place] [address]  Phone: [phone]  Fax: [phone]  Follow Up Time: 1 week    Lakisha Nelson (PA)  Physician Assistant Services  [address] [place] [id number]  Phone: ()-  Fax: ()-  Follow Up Time: 1 week

## 2021-11-24 NOTE — CONSULT NOTE ADULT - ASSESSMENT
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

## 2021-11-24 NOTE — DISCHARGE NOTE PROVIDER - HOSPITAL COURSE
72 yo F w/ Hx IIIC1 gr1 endometrial cancer 10/1/19 s/p PITO, pelvic EBRT and vaginal brachytherapy (completed 5/14/2020), Carboplatin/Taxol x 3 (completed 6/20/2020), HTN and HLD  p/w complaints of blood in stools, admitted to medicine for GIB, followed by GI Dr. Marquez.  CT A/P with contrast was negative for obstruction with findings likely representing proctocolitis, infectious vs. post radiation etiology.  Pt. is afebrile with no leukocytosis with no further bleeding noted/reported.  Recommended daily Mesalamine  enemas x 2 weeks.  Pt. is medically stable for discharge to home with OP GI f/u.     70 yo F w/ Hx IIIC1 gr1 endometrial cancer 10/1/19 s/p PITO, pelvic EBRT and vaginal brachytherapy (completed 5/14/2020), Carboplatin/Taxol x 3 (completed 6/20/2020), HTN and HLD  p/w complaints of blood in stools, admitted to medicine for GIB, followed by GI Dr. Marquez.  CT A/P with contrast was negative for obstruction with findings likely representing proctocolitis, infectious vs. post radiation etiology.  Pt. is afebrile with no leukocytosis with no further bleeding noted/reported.  Recommended daily Mesalamine  enemas x 2 weeks.  Pt. is medically stable for discharge to home with OP GI f/u.      Advanced care planning was discussed with patient and family.  Advanced care planning forms were reviewed and discussed as appropriate.  Differential diagnosis and plan of care discussed with patient after the evaluation.   Pain assessed and judicious use of narcotics when appropriate was discussed.  Importance of Fall prevention discussed.  Counseling on Smoking and Alcohol cessation was offered when appropriate.  Counseling on Diet, exercise, and medication compliance was done.       Approx 60 minutes spent.

## 2021-11-24 NOTE — DISCHARGE NOTE PROVIDER - NSDCCPCAREPLAN_GEN_ALL_CORE_FT
PRINCIPAL DISCHARGE DIAGNOSIS  Diagnosis: Acute lower GI bleeding  Assessment and Plan of Treatment: You presented with blood in your stool and admitted for gastrointestinal bleed.  You were followed by gastroenterologist and underwent CT scan of the abdomen which showed colitis likely due to radiation therapy.  -One Mesalamine enema was administered while yo       PRINCIPAL DISCHARGE DIAGNOSIS  Diagnosis: Acute lower GI bleeding  Assessment and Plan of Treatment: You presented with blood in your stool and admitted for gastrointestinal bleed.  You were followed by gastroenterologist and underwent CT scan of the abdomen which showed colitis likely due to radiation therapy.  You were recommended Mesalamine enemas to decrease inflammation  -One mesalamine enema was administered while you were here  -Please continue daily enemas for 2 weeks  -Please follow up with outpatient gastroenterologist within one week  -PLEASE COME BACK TO THE HOSPITAL IMMEDIATELY IF YOU NOTICE BLOOD IN YOUR STOOLS  -One Mesalamine enema was administered while yo      SECONDARY DISCHARGE DIAGNOSES  Diagnosis: History of cervical cancer  Assessment and Plan of Treatment:     Diagnosis: HTN (hypertension)  Assessment and Plan of Treatment: Continue your anti hypertensive medications as prior to hospitalization  Follow up with your primary doctor within one week

## 2021-11-24 NOTE — DISCHARGE NOTE PROVIDER - PROVIDER TOKENS
PROVIDER:[TOKEN:[[id number]],FOLLOWUP:[1 week]],PROVIDER:[TOKEN:[[id number]],FOLLOWUP:[1 week]]

## 2021-11-24 NOTE — DISCHARGE NOTE PROVIDER - NSDCMRMEDTOKEN_GEN_ALL_CORE_FT
hydroCHLOROthiazide 25 mg oral tablet: 1 tab(s) orally once a day  Lipitor 10 mg oral tablet: 1 tab(s) orally once a day  potassium chloride 10 mEq oral capsule, extended release: 1 cap(s) orally once a day  Protonix 40 mg oral delayed release tablet: 1 tab(s) orally once a day   ramipril 5 mg oral capsule: 1 cap(s) orally once a day  Rowasa 4 g/60 mL rectal kit: 1 kit rectally once a day

## 2021-11-24 NOTE — DISCHARGE NOTE NURSING/CASE MANAGEMENT/SOCIAL WORK - PATIENT PORTAL LINK FT
You can access the FollowMyHealth Patient Portal offered by [facility] by registering at the following website: [email or web address]. By joining [employer]’s FollowMyHealth portal, you will also be able to view your health information using other applications (apps) compatible with our system.

## 2021-11-24 NOTE — CONSULT NOTE ADULT - SUBJECTIVE AND OBJECTIVE BOX
Patient is a 71y old  Female who presents with a chief complaint of GIB (2021 10:39)     .     HPI:    HPI:  70 yo F w/ Hx IIIC1 gr1 endometrial cancer 10/1/19 s/p PITO, pelvic EBRT and vaginal brachytherapy (completed 2020), Carboplatin/Taxol x 3 (completed 2020), HTN and HLD who is p/w complaints of blood in stools. Ms. Barakat states that since her radiotherapy completed she ha suffered from abdominal bloating, gas and churning sensation in her lower bowels. She denies any changes in her stools prior to today when she noticed a sudden urge to defecate after eating a muffin and saw bright red blood intermixed in stool and on TP with wiping. Had a subsequent BM with small blood and some clots. Never had blood in stool prior. Denies any fevers, abdominal pain, stool changes. Has reported 20lb weight loss since completing cancer treatment. Able to tolerate diet normally, but has diminished appetite. Last colonoscopy , has hx of hemorrhoids. CT C/A/P 2020 showed no evidence of disease. (2021 14:48)          REVIEW OF SYSTEMS  Constitutional:   No fever, no fatigue, no pallor, no night sweats, no weight loss.  HEENT:   No eye pain, no vision changes, no icterus, no mouth ulcers.  Respiratory:   No shortness of breath, no cough, no respiratory distress.   Cardiovascular:   No chest pain, no palpitations.   Gastrointestinal: No abdominal pain, no nausea, no vomiting , no diahrrea, no constipation, + hematochezia,no melena.  Skin:   No rashes, no jaundice, no eczema.   Musculoskeletal:   No joint pain, no swelling, no myalgia.   Neurologic:   No headache, no seizure, no weakness.   Genitourinary:   No dysuria, no decreased urine output.  Psychiatric:  No depression, no anxiety,   Endocrine:   No thyroid disease, no diabetes.  Heme/Lymphatic:   No anemia, no blood transfusions, no lymph node enlargement, no bleeding, no bruising.  ___________________________________________________________________________________________  Allergies    No Known Allergies    Intolerances      MEDICATIONS  (STANDING):  atorvastatin 10 milliGRAM(s) Oral at bedtime  pantoprazole  Injectable 40 milliGRAM(s) IV Push daily  sodium chloride 0.9%. 1000 milliLiter(s) (85 mL/Hr) IV Continuous <Continuous>    MEDICATIONS  (PRN):      PAST MEDICAL & SURGICAL HISTORY:  Essential hypertension    Hyperlipidemia, unspecified hyperlipidemia type    Benign neoplasm breast, left    Scoliosis, unspecified scoliosis type, unspecified spinal region    Sciatica of left side    Breast calcification seen on mammogram  right breast    Fibroids    Neck pain  Pt states last studies     H/O surgical biopsy  left breast 2018    History of scoliosis  s/p surgical repair  x 2 childhood    Ductal papillomatosis of breast, left  s/p excision &#x27;s    H/O breast surgery  right breast calcifications removed, breast marker placed over 20 years ago    S/P hysterectomy  [facility] 10/01/19      FAMILY HISTORY:  Family history of Alzheimer&#x27;s disease (Mother)      Social History: No hsitory of : Tobacco use, IVDA, EToH  ______________________________________________________________________________________    PHYSICAL EXAM    Daily     Daily Weight in k (2021 03:26)  BMI: 21.9 (11-23 @ 11:36)  Change in Weight:  Vital Signs Last 24 Hrs  T(C): 36.8 (2021 12:52), Max: 36.9 (2021 19:30)  T(F): 98.2 (2021 12:52), Max: 98.4 (2021 19:30)  HR: 87 (2021 12:52) (59 - 87)  BP: 128/76 (2021 12:52) (128/76 - 153/82)  BP(mean): 103 (2021 19:30) (103 - 103)  RR: 18 (2021 12:52) (18 - 18)  SpO2: 100% (2021 12:52) (97% - 100%)    General:  Well developed, well nourished, alert and active, no pallor, NAD.  HEENT:    Normal appearance of conjunctiva, ears, nose, lips, oropharynx, and oral mucosa, anicteric.  Neck:  No masses, no asymmetry.  Lymph Nodes:  No lymphadenopathy.   Cardiovascular:  RRR normal S1/S2, no murmur.  Respiratory:  CTA B/L, normal respiratory effort.   Abdominal:   soft, no masses or tenderness, normoactive BS, NT/ND, no HSM.  Extremities:   No clubbing or cyanosis, normal capillary refill, no edema.   Skin:   No rash, jaundice, lesions, eczema.   Musculoskeletal:  No joint swelling, erythema or tenderness.   Neuro: No focal deficits.   Other:   _______________________________________________________________________________________________  Lab Results:                          10.9   2.85  )-----------( 188      ( 2021 06:38 )             34.4     11-24    141  |  107  |  13  ----------------------------<  75  3.7   |  29  |  0.80    Ca    8.8      2021 06:38  Phos  3.2     11-24  Mg     1.3     -24    TPro  6.2  /  Alb  2.8<L>  /  TBili  0.6  /  DBili  x   /  AST  19  /  ALT  18  /  AlkPhos  77  11-24    LIVER FUNCTIONS - ( 2021 06:38 )  Alb: 2.8 g/dL / Pro: 6.2 g/dL / ALK PHOS: 77 U/L / ALT: 18 U/L DA / AST: 19 U/L / GGT: x           PT/INR - ( 2021 12:20 )   PT: 12.8 sec;   INR: 1.08 ratio         PTT - ( 2021 12:20 )  PTT:30.4 sec  Sedimentation Rate, Erythrocyte: 10 mm/Hr ( @ 06:38)        Stool Results:          RADIOLOGY RESULTS:    SURGICAL PATHOLOGY:

## 2021-11-24 NOTE — DISCHARGE NOTE PROVIDER - CARE PROVIDERS DIRECT ADDRESSES
resting/sleeping/comfortable appearance comfortable appearance/resting/sleeping/family/SO at bedside/cooperative ,[email or web address],DirectAddress_Unknown

## 2022-08-26 NOTE — H&P PST ADULT - RESPIRATORY RATE (BREATHS/MIN)
16 Ketoconazole Pregnancy And Lactation Text: This medication is Pregnancy Category C and it isn't know if it is safe during pregnancy. It is also excreted in breast milk and breast feeding isn't recommended.

## 2023-02-07 NOTE — PRE-ANESTHESIA EVALUATION ADULT - NSANTHALCOHOLSD_GEN_ALL_CORE
[facility] PRE-OPERATIVE NOTE     Chief Complaint had concerns including Office Visit and Pre-Op Exam.  Surgery Date: 02/14/2023  Planned Surgery: TOTAL KNEE ARTHROPLASTY LEFT  Type of Anesthesia: General  Pre-op Diagnosis: Primary osteoarthritis of left knee [M17.12]  Requesting Surgeon: Ramy Carson MD    Subjective   BRIEF HISTORY LEADING to SURGERY: Tiffany Avalos is a 52 year old female here for pre-operative anesthesia clearance consult for surgery as requested by the surgeon. Patient relates no recent illness and is feeling in usual state of health.       Chronic Health problems    SANDY on CPAP  -no longer using CPAP, unable to tolerate mask   -reports some daytime sleepiness    Extrinsic asthma without complication  -denies wheezing/coughing at night  -does not have inhaler at home    Fibromyalgia  -taking muscle relaxers, cymbalta, lyrica   -reports minimal relief    Benign essential hypertension  -/72 today, taking Lisinopril as prescribed    BMI 42  Reports eating unhealthy diet d/t poor work schedule  Started new job this week and hopeful to improve diet    History of substance abuse (CMS/[facility])  -smokes 1/2ppd -wants to quit, states not motivated, smoking more recently d/t feeling stressed about upcoming surgery  -smokes marijuana 3-4 nights/week \"for pain relief\"  -drinks 2 beers \"several days per week\"    Arthritis of both knees  L knee achy at all times, taking tylenol for pain,   surgery planned next week    Hyperlipidemia, unspecified hyperlipidemia type  -Triglycerides 302  Tolerating Atorvastatin    Tremors of nervous system  -taking pramipexole,  -reports full body feels tremors at rest  -Sees Neurologist q 3 mos to monitor    Aneurysm, carotid artery, internal  -stable on last imaging (10/2022)  -denies dizziness today    Stroke, aneurysm 2012 ([facility])  -No residual effects per pt       Review of Systems   Constitutional: Negative.    HENT: Negative.    Eyes: Negative.    Respiratory: Negative.     Cardiovascular: Negative.    Gastrointestinal: Negative.    Endocrine: Negative.    Genitourinary: Negative.    Musculoskeletal: Positive for arthralgias (L knee pain, \"pain all over from Fibromyalgia\").   Skin: Negative.    Allergic/Immunologic: Negative.    Neurological: Negative.    Hematological: Negative.    Psychiatric/Behavioral: Negative.    All other systems reviewed and are negative.      Objective   PHYSICAL EXAM  Visit Vitals  /72   Pulse 71   Ht 5' 3\" (1.6 m)   Wt 107.8 kg (237 lb 9.6 oz)   SpO2 97%   BMI 42.09 kg/m²     BP Readings from Last 4 Encounters:   02/07/23 122/72   12/29/22 124/68   10/28/22 117/62   10/20/22 106/76       Wt Readings from Last 4 Encounters:   02/07/23 107.8 kg (237 lb 9.6 oz)   12/29/22 105.7 kg (233 lb)   10/28/22 102 kg (224 lb 13.9 oz)   10/20/22 99.3 kg (219 lb)       Physical Exam  Vitals and nursing note reviewed.   Constitutional:       Appearance: Normal appearance. She is well-developed. She is obese.   HENT:      Head: Normocephalic and atraumatic.      Right Ear: Tympanic membrane normal.      Left Ear: Tympanic membrane normal.      Nose: Nose normal.      Mouth/Throat:      Mouth: Mucous membranes are dry.      Dentition: Abnormal dentition (no teeth on upper, partial denture lower, poor dentiition).      Pharynx: Oropharynx is clear.      Neck: Normal range of motion.   Eyes:      Extraocular Movements: Extraocular movements intact.      Conjunctiva/sclera: Conjunctivae normal.      Pupils: Pupils are equal, round, and reactive to light.   Cardiovascular:      Rate and Rhythm: Normal rate and regular rhythm.      Pulses: Normal pulses.      Heart sounds: Normal heart sounds.   Pulmonary:      Effort: Pulmonary effort is normal.      Breath sounds: Wheezing (RLL expiratory wheeze, cleared with deep breath) present.   Abdominal:      General: Abdomen is flat. Bowel sounds are normal.      Palpations: Abdomen is soft.   Genitourinary:     General: Normal  vulva.      Rectum: Normal.   Musculoskeletal:         General: Normal range of motion.      Right lower le+ Pitting Edema present.      Left lower le+ Pitting Edema present.   Skin:     General: Skin is warm and dry.      Findings: Rash (dry skin patch to back of L ankle) present.      Comments: bilateral lower extremities shiny   Neurological:      General: No focal deficit present.      Mental Status: She is alert and oriented to person, place, and time. Mental status is at baseline.   Psychiatric:         Mood and Affect: Mood normal.         Behavior: Behavior normal. Behavior is cooperative.          RESULTS REVIEW         Most Recent  Na+  141 (10/28/2022)  K+  3.9 (10/28/2022)   Glucose  109 (10/28/2022)  GFR  >90 (10/28/2022)        Most Recent  WBC  7.6 (10/28/2022)  HGB  12.9 (10/28/2022)     Platelet  262 (10/28/2022)               PAST MEDICAL HISTORIES        Problem List: has Spasmodic torticollis; SANDY (obstructive sleep apnea)- cannot tolerate CPAP; Extrinsic asthma, unspecified; Disc disease, degenerative, lumbar or lumbosacral; Low back pain; Aneurysm, carotid artery, internal; Benign joint hypermobility; Tremors of nervous system; Carpal tunnel syndrome on both sides; Arthritis of both knees; Hyperlipidemia; Benign essential hypertension; Severe obesity (BMI 35.0-39.9) with comorbidity (CMS/HCC); Marijuana use; History of cocaine use; History of alcohol abuse; Fibromyalgia; Carpal tunnel syndrome of right wrist; Weakness of upper extremity; Numbness and tingling in right hand; and Pain of right upper extremity on their problem list.  Medical:  has a past medical history of Anxiety, Arterial ischemic stroke, vertebrobasilar, brainstem, remote, resolved (), Arthritis, Bronchitis (childhood), Cerebral infarction (CMS/HCC), Depression, Essential (primary) hypertension, Fracture (2020), History of chicken pox, Kidney stones, Mild intermittent asthma, Miscarriage, Normal vaginal delivery,  SANDY (obstructive sleep apnea) (Resolved), Other and unspecified hyperlipidemia, Other chronic pain, Panic attacks, Personal history of traumatic fracture (circa 1982), RAD (reactive airway disease), Restless legs syndrome (RLS), Right knee pain, SAH (subarachnoid hemorrhage) (CMS/[facility]) (2012), Spasmodic torticollis, Thumb laceration (04/11/2014), Tobacco use, Unspecified sinusitis (chronic), Urinary tract infection, Wears glasses, and Wound dehiscence (02/13/2016).    She has no past medical history of PONV (postoperative nausea and vomiting).  Surgical:  has a past surgical history that includes Hysterectomy; Shoulder arthroscopy (Right, 07/16/2010); Kidney stone surgery (2010, 2011); IR Cerebral Angiogram (2/5/15); Shoulder arthroscopy (Left, 12/19/2013); lumbar spine fusn,post interbody (1/14/2016); Knee surgery; Knee surgery (Right, 02/26/2019); Knee surgery (Right, 09/29/2020); and Colonoscopy (02/05/2021).  Family: family history includes Asthma in her son; Cancer in her maternal grandfather; Diabetes in her mother; Heart disease in her father; High blood pressure in her father and mother; Neurological Disorder in her brother; Osteoarthritis in her father, mother, and sister; Psychiatric in her sister.  Social:  reports that she has been smoking cigarettes. She has a 15.00 pack-year smoking history. She has never used smokeless tobacco. She reports current alcohol use of about 8.0 standard drinks per week. She reports current drug use. Frequency: 4.00 times per week. Drug: Marijuana.  Allergies: has No Known Allergies.     SURGICAL RISK HISTORY     Functional Capacity  Are you able to do light housework, dusting, wash dishes, climb a flight of stairs or walk up a hill without chest pain or problems breathing (>4 METS)? YES    Malnutrition Screening Current weight 238 pounds  Have you recently lost weight without trying? No  Have you been eating poorly because of a decreased appetite? No  Score = 0, Not at Risk        Family History Risks  Adverse Reaction to Anesthesia: No  Bleeding or Blood Disorder: None  Malignant Hyperthermia: none    Personal Surgical History  Anesthetic Complications: N/V after last surgery  Problems with Surgery: No    Revised Cardiac Risk Index  Intermediate or Higher Risk Surgery No   History of Ischemic Heart Disease or Cardiac Chest Pain No   Congestive Heart Failure No; Ejection Fraction 60 (10/13/2021)   History of Stroke or TIA 2012 ([facility])    Last Creatinine >2 No; 0.6 g/dL (10/28/2022)   Prescribed Insulin or GLP-1 No   Estimated Risk of a Major Cardiac Complication 1 risk factor = 6.0%       I have reviewed and updated the following electronic medical record sections: Allergies, Problem List, Past Medical History, and Past Surgical History.    Advance Directives Documented Yes  Power of  for Health Care    ASSESSMENT AND PLAN       1. Pre-op evaluation  -     CBC WITH DIFFERENTIAL; Future  -     COMPREHENSIVE METABOLIC PANEL; Future  -     STAPHYLOCOCCUS AUREUS METHICILLIN SENSITIVE (MSSA) AND METHICILLIN RESISTANT (MRSA) PCR  -     URINALYSIS & REFLEX MICROSCOPY WITH CULTURE IF INDICATED  -     STAPHYLOCOCCUS AUREUS METHICILLIN SENSITIVE (MSSA) AND METHICILLIN RESISTANT (MRSA) PCR  -     NT PROBNP; Future  -     XR CHEST PA AND LATERAL; Future  2. SANDY on CPAP  3. Extrinsic asthma without complication, unspecified asthma severity, unspecified whether persistent  4. Fibromyalgia  5. Benign essential hypertension  6. Severe obesity (BMI 35.0-39.9) with comorbidity (CMS/[facility])  7. History of substance abuse (CMS/[facility])  8. Arthritis of both knees  9. Hyperlipidemia, unspecified hyperlipidemia type  10. Tremors of nervous system  11. Aneurysm, carotid artery, internal  12. Bilateral lower extremity edema  -     Prior US right lower extremity negative for DVT. Check NT PROBNP- if this is elevated would want to check echocardiogram prior to surgery.    -consider adding lasix after review of  labs  13. Wheezing   -     NT PROBNP; Future  -     XR CHEST PA AND LATERAL; Future  14. Post-operative nausea and vomiting  15. History of stroke without residual deficits  Other orders  -     albuterol 108 (90 Base) MCG/ACT inhaler; Inhale 1 puff into the lungs every 4 hours as needed for Shortness of Breath or Wheezing.    Optimized for surgery?   - Yes patient is medically optimized for surgery pending test results: So long as preoperative testing results are appropriate, I see no contraindication to planned procedure.  - Recommended workup: CBC, BMP, UA, MRSA swab nares, BNP, CXR  - Additional Preop management: none  - Post-op DVT Prophylaxis: per surgical team    Pertinent Periop Conditions    #Benign Essential Hypertension today's blood pressure was 122/72    #Castro On Cpap  -not using CPAP    #History Of Substance Abuse  -discussed limiting alcohol, marijuana, and nicotine use prior to surgery  -refused Nicotine patch    #BMI 42.09    #Extrinsic Asthma  - well controlled, not using rescue inhaler      Current Medications: has a current medication list which includes the following prescription(s): lisinopril, duloxetine, triamcinolone, methylprednisolone, pregabalin, tizanidine, pramipexole, meclizine, montelukast, atorvastatin, primidone, lidocaine, bupropion, omega-3 fatty acids, acetaminophen, multiple vitamin, duloxetine, cholecalciferol, albuterol, and temazepam.      Before Surgery Hold these Medications  -NSAIDS (ibuprofen, naproxen, meloxicam, celebrex, diclofenac) for 7 days prior to surgery   -Multivitamins and over the counter herbal supplements for 7 days prior to surgery  -Aspirin for 7 days prior to surgery given risk of bleeding in a closed space.  -Any ACE-Inhibitor Lisinoprilfor 24 hours before surgery.    -Other chronic medications are to be continued unless an alternative plan was advised with the surgeon and/or specialist.    Sent copy of note to: Ramy Carson MD  On 2/7/2023, Bree WATSON  ANTOINE Joyce, P student, scribed the services personally performed by Raysa Roque NP.        APC Attestation:  I have personally interviewed and examined the patient via face-to-face. I confirmed the findings listed below:    • Pre-op evaluation  (primary encounter diagnosis)  • SANDY on CPAP  • Extrinsic asthma without complication, unspecified asthma severity, unspecified whether persistent  • Fibromyalgia  • Benign essential hypertension  • History of substance abuse (CMS/HCC)  • Arthritis of both knees  • Hyperlipidemia, unspecified hyperlipidemia type  • Tremors of nervous system  • Aneurysm, carotid artery, internal  • Bilateral lower extremity edema  • Wheezing   • Post-operative nausea and vomiting  • History of stroke without residual deficits  • Severe obesity (CMS/HCC)     This was discussed with the student. I have personally performed the documented history, exam findings, and medical decision making. I was present for the entire procedure.The plan of care was discussed with the patient.    No

## 2023-05-24 NOTE — H&P PST ADULT - PAIN, FACTORS THAT RELIEVE, PROFILE
Cardiovascular Surgery Progress Note  06/17/2022         Assessment and Plan:     Debi Espinoza is a 77 year old female with PMH of HTN, nephrolithiasis, DVT (2019) and PE (2019, 2021) on chronic anticoagulation, and chronic thromboembolic pulmonary hypertension who underwent pulmonary artery thromboendarterectomy on 5/27 with Dr. Peterson.    Recommendations  - Continue lidocaine patches for incisional pain control.  - Chest x-ray reviewed  - Likely remove chest tube over the weekend, provided continued decrease in output. Continue to suction for now.  - Continue oral vancomycin for C-diff. Continue rectal pouch for incontinence control  - Continue Anticoagulation with Apixiban  - Other cares per primary team    Discussed with Surgeon, Dr. Peterson via written and verbal commnication.     Jeremy Clemens PA-c  Pager: [phone]  12:11 PM June 17, 2022           Interval History:     No overnight events.  States pain around incision nis still there and would like her lidocaine patch on  Tolerating diet and tube feeds, is passing flatus, BM continuous with rectal pouch. No nausea or vomiting.  Breathing on HFNC without complaints.   Working with therapies.   Denies chest pain, palpitations, dizziness, syncopal symptoms, fevers, chills, myalgias, or sternal popping/clicking.         Physical Exam:   Blood pressure 112/50, pulse 77, temperature 97.7  F (36.5  C), temperature source Oral, resp. rate 27, height 1.524 m (5'), weight 70.4 kg (155 lb 3.3 oz), SpO2 98 %, not currently breastfeeding.  Vitals:    06/14/22 0000 06/16/22 0400 06/17/22 0600   Weight: 66.8 kg (147 lb 4.3 oz) 68.1 kg (150 lb 2.1 oz) 70.4 kg (155 lb 3.3 oz)        Gen: A&Ox4, NAD  Neuro: no focal deficits   CV: RRR, normal S1 S2, no murmurs, rubs or gallops.   Vascular: Peripheral pulses difficult to assess due to LE edema.   Pulm: crackles heard through out lung fields, no wheezing or rhonchi, breathing on HFNC  Abd: nondistended, normal BS, soft,  Physical Therapy Progress Note    Visit Type: Progress Note -  Daily Treatment Note  Visit: 6  Referring Provider: MARYANN Wang  Medical Diagnosis (from order): Diagnosis Information    Diagnosis  843.8 (ICD-9-CM) - S76.312A (ICD-10-CM) - Strain of left hamstring muscle, initial encounter         SUBJECTIVE                                                                                                               Patient reports doing better overall. Able to tolerate sitting in car for longer periods of time. However, started a new job and doesn't have ideal sitting situation for lunch where he can offload his ischial tuberosities so this has increased his pain again.    Will be starting new job soon so will see if this improves.  Does respond well to very deep tissue work in therapy but will see how he feels with change in job again before deciding whether to return or not.  May consider injection down the road as well if continues to have symptoms remain.    Current Functional Limitations: Limited sitting tolerance if not ideal set up, stairs can be aggravating depending on the time of day, has to modify position for donning/doffing socks and shoes      OBJECTIVE                                                                                                                     Strength  (out of 5 unless noted, standard test position unless noted)   Hip:    - Extension:        • Left: 4, pain        • Right: 4+    - Abduction:        • Left: pain, 4        • Right: 5  Knee:    - Flexion:        • Left: 5, pain        • Right: 5         Palpation  Hip: [place]/Tendon/Bone  - Ischial Tuberosity: - Left: tenderness              Outcome/Assessments  Outcome Measures:   Lower Extremity Functional Scale: LEFS Calculated Total: 42 (0=extreme difficulty; 80=no difficulty) see flowsheet for additional documentation      Treatment    Ultrasound (35534)  - Location: left proximal hamstrings   - Position: prone  -  Duty Cycle: 100%  - Frequency: 1 Mhz  - Intensity (w/cm2): 1.5  - Duration: 10 minutes + 2 minutes for set up    Results: decreased pain and tissue softening  Reaction: no adverse reaction to treatment      Manual Therapy   -Soft Tissue Mobilization in prone to left hamstrings proximal to distal with focus at origin to decrease pain, improve blood flow and increase myofascial mobility.      Therapeutic Activity  -objective measurements taken as noted above  -LEFS issued and obtained  -ensured all patient questions/concerns addressed including use of tennis ball in sitting, discussion of updates to POC, discussion of injection    Skilled input: verbal instruction/cues and as detailed above    Writer verbally educated and received verbal consent for hand placement, positioning of patient, and techniques to be performed today from patient for therapist position for techniques, clothing adjustments for techniques, hand placement and palpation for techniques and modality application as described above and how they are pertinent to the patient's plan of care.  Home Exercise Program  Access Code: [email or web address]  URL: [email or web address]  Date: 04/25/2023  Prepared by: Nanci Bryan    Exercises  - Supine Hamstring Stretch  - 2-4 x daily - 7 x weekly - 1-2 sets - 10 reps - 5 s hold  - Supine Bridge  - 2 x daily - 7 x weekly - 1-2 sets - 10 reps - 5 s hold        ASSESSMENT                                                                                                          Patient continues to have impairments and functional deficits as noted.  Patient will continue to benefit from skilled care as outlined.    Patient has made progress thus far as noted by change in LEFS score, subjective feedback, and MMT obtained.  However, continues to have particularly limited sitting tolerance, especially when unable to offload properly. Also pain noted with hamstring and glute activation against resistance.   nontender  Ext: positive dependent peripheral edema, 2+ pitting. Warm and soft.   Incision: clean, dry, intact, no erythema, sternum stable  Tubes/drain sites: dressing clean and dry         Data:    Imaging:  reviewed recent imaging    Chest x-ray 6/17  IMPRESSION:   1. Stable bilateral interstitial and airspace pulmonary opacities.  2. Stable bilateral small pleural effusions.  3. Stable support devices.        Labs:  CBC  Recent Labs   Lab 06/17/22  0454 06/16/22  0411 06/15/22  0422 06/14/22  0423   WBC 14.0* 14.0* 12.0* 17.3*   RBC 2.76* 2.60* 2.59* 2.59*   HGB 7.9* 7.7* 7.6* 7.4*   HCT 26.4* 24.7* 24.5* 24.1*   MCV 96 95 95 93   MCH 28.6 29.6 29.3 28.6   MCHC 29.9* 31.2* 31.0* 30.7*   RDW 18.3* 18.0* 18.1* 17.9*   * 417 415 428     CMP:  Last Comprehensive Metabolic Panel:  Sodium   Date Value Ref Range Status   06/17/2022 141 133 - 144 mmol/L Final     Potassium   Date Value Ref Range Status   06/17/2022 4.8 3.4 - 5.3 mmol/L Final   05/30/2022 3.4 3.4 - 5.3 mmol/L Final     Chloride   Date Value Ref Range Status   06/17/2022 105 94 - 109 mmol/L Final     Carbon Dioxide (CO2)   Date Value Ref Range Status   06/17/2022 30 20 - 32 mmol/L Final     Anion Gap   Date Value Ref Range Status   06/17/2022 6 3 - 14 mmol/L Final     Glucose   Date Value Ref Range Status   06/17/2022 141 (H) 70 - 99 mg/dL Final     Urea Nitrogen   Date Value Ref Range Status   06/17/2022 49 (H) 7 - 30 mg/dL Final     Creatinine   Date Value Ref Range Status   06/17/2022 1.16 (H) 0.52 - 1.04 mg/dL Final     GFR Estimate   Date Value Ref Range Status   06/17/2022 48 (L) >60 mL/min/1.73m2 Final     Comment:     Effective December 21, 2021 eGFRcr in adults is calculated using the 2021 CKD-EPI creatinine equation which includes age and gender (Susan et al., NEJM, DOI: 10.1056/WZNAlc0129617)     Calcium   Date Value Ref Range Status   06/17/2022 8.7 8.5 - 10.1 mg/dL Final     Bilirubin Total   Date Value Ref Range Status   06/12/2022  1.1 0.2 - 1.3 mg/dL Final     Alkaline Phosphatase   Date Value Ref Range Status   06/12/2022 111 40 - 150 U/L Final     ALT   Date Value Ref Range Status   06/12/2022 16 0 - 50 U/L Final     AST   Date Value Ref Range Status   06/12/2022 25 0 - 45 U/L Final     BMP  Recent Labs   Lab 06/17/22  0454 06/16/22  0411 06/16/22  0410 06/16/22  0040 06/15/22  0427 06/15/22  0422 06/14/22  0812 06/14/22 0423    143  --   --   --  140  --  140   POTASSIUM 4.8 4.1  --   --   --  4.3  --  4.1   CHLORIDE 105 107  --   --   --  108  --  109   CHELE 8.7 8.9  --   --   --  9.0  --  9.0   CO2 30 29  --   --   --  29  --  27   BUN 49* 45*  --   --   --  44*  --  40*   CR 1.16* 0.97  --   --   --  1.09*  --  1.03   * 113* 112* 133*   < > 131*   < > 120*    < > = values in this interval not displayed.     INR  Recent Labs   Lab 06/15/22  0422 06/14/22 0423 06/13/22  0429 06/12/22 0448   INR 1.93* 1.90* 1.89* 2.11*      Hepatic Panel  Recent Labs   Lab 06/12/22 0448   AST 25   ALT 16   ALKPHOS 111   BILITOTAL 1.1   ALBUMIN 2.0*     GLUCOSE:   Recent Labs   Lab 06/17/22  0454 06/16/22  0411 06/16/22  0410 06/16/22  0040 06/15/22  2114 06/15/22  1720   * 113* 112* 133* 107* 122*        Patient may eventually consider other intervention such as injection if therapy does not carry over to long term relief/resolution of symptoms as patient gets relief with manual therapy and US in particular but can be exacerbated as seen this past couple of weeks.  Patient will trial tennis ball for self during trial of self management again.  Will return depending on if has exacerbation/continued symptoms.  Pain after session: not formally rated.  Education:   - Results of above outlined education: Verbalizes understanding and Demonstrates understanding    PLAN                                                                                                                          Extend frequency and duration per below.  Frequency / Duration  1 times per week tapering as patient progresses for 8 weeks for an estimated total of 10 visits    Suggestions for next session as indicated: Patient will see how he feels over the next couple of weeks again following change in work position and will contact clinic if further intervention is needed.  POC being extended for this circumstance.  Otherwise, informal discharge in 30 days from this date if no further appts scheduled.        Goals  Decrease pain/symptoms to at worst 3/10 not rate  Improve involved strength to 5/5- progressing   The above improvements in impairments to assist in obtaining goals listed below  Long Term Goals: to be met by end of plan of care  1. Patient will sit for at least 60 minutes without reported pain or difficulty for completion of independent tasks of living, completing farm work tasks, driving long distances. Status: progressing/ongoing  2. Patient will ascend and descend 1 flight of steps, without pain, and without difficulty for completion of household tasks such as laundry related tasks, safe household negotiation. Status: progressing/ongoing  3. Patient will bend/squat with efficient eccentric control, without reported pain and without  reported difficulty for completion of household tasks such as picking up objects off the floor. Status: progressing/ongoing  4. Lower Extremity Functional Scale: Patient will complete form to reflect an improved raw score to greater than or equal to 37/80 to indicate patient reported improvement in function/disability/impairment (minimal detectable change: 9 points).  Status: met  5/26: score of 42 on LEFS   5. Patient will be independent with progressed and modified home exercise program. Status: progressing/ongoing      Therapy procedure time and total treatment time can be found documented on the Time Entry flowsheet     lidocaine gel, exercise/medications

## 2024-03-20 ENCOUNTER — EMERGENCY (EMERGENCY)
Facility: HOSPITAL | Age: 74
LOS: 1 days | Discharge: HOME | End: 2024-03-20
Attending: EMERGENCY MEDICINE
Payer: MEDICARE

## 2024-03-20 VITALS
OXYGEN SATURATION: 97 % | HEART RATE: 79 BPM | TEMPERATURE: 98 F | DIASTOLIC BLOOD PRESSURE: 88 MMHG | SYSTOLIC BLOOD PRESSURE: 159 MMHG | RESPIRATION RATE: 17 BRPM

## 2024-03-20 VITALS
HEIGHT: 61 IN | DIASTOLIC BLOOD PRESSURE: 92 MMHG | TEMPERATURE: 98 F | HEART RATE: 75 BPM | OXYGEN SATURATION: 100 % | WEIGHT: 104.72 LBS | RESPIRATION RATE: 18 BRPM | SYSTOLIC BLOOD PRESSURE: 167 MMHG

## 2024-03-20 DIAGNOSIS — Z98.890 OTHER SPECIFIED POSTPROCEDURAL STATES: Chronic | ICD-10-CM

## 2024-03-20 DIAGNOSIS — Z87.39 PERSONAL HISTORY OF OTHER DISEASES OF THE MUSCULOSKELETAL SYSTEM AND CONNECTIVE TISSUE: Chronic | ICD-10-CM

## 2024-03-20 DIAGNOSIS — N60.12 DIFFUSE CYSTIC MASTOPATHY OF LEFT BREAST: Chronic | ICD-10-CM

## 2024-03-20 DIAGNOSIS — Z90.710 ACQUIRED ABSENCE OF BOTH CERVIX AND UTERUS: Chronic | ICD-10-CM

## 2024-03-20 LAB
ALBUMIN SERPL ELPH-MCNC: 3.3 G/DL — LOW (ref 3.5–5)
ALP SERPL-CCNC: 83 U/L — SIGNIFICANT CHANGE UP (ref 40–120)
ALT FLD-CCNC: 12 U/L DA — SIGNIFICANT CHANGE UP (ref 10–60)
ANION GAP SERPL CALC-SCNC: 3 MMOL/L — LOW (ref 5–17)
APPEARANCE UR: CLEAR — SIGNIFICANT CHANGE UP
AST SERPL-CCNC: 14 U/L — SIGNIFICANT CHANGE UP (ref 10–40)
BASOPHILS # BLD AUTO: 0.02 K/UL — SIGNIFICANT CHANGE UP (ref 0–0.2)
BASOPHILS NFR BLD AUTO: 0.8 % — SIGNIFICANT CHANGE UP (ref 0–2)
BILIRUB SERPL-MCNC: 0.4 MG/DL — SIGNIFICANT CHANGE UP (ref 0.2–1.2)
BILIRUB UR-MCNC: NEGATIVE — SIGNIFICANT CHANGE UP
BUN SERPL-MCNC: 12 MG/DL — SIGNIFICANT CHANGE UP (ref 7–18)
CALCIUM SERPL-MCNC: 8.5 MG/DL — SIGNIFICANT CHANGE UP (ref 8.4–10.5)
CHLORIDE SERPL-SCNC: 109 MMOL/L — HIGH (ref 96–108)
CO2 SERPL-SCNC: 29 MMOL/L — SIGNIFICANT CHANGE UP (ref 22–31)
COLOR SPEC: YELLOW — SIGNIFICANT CHANGE UP
CREAT SERPL-MCNC: 0.77 MG/DL — SIGNIFICANT CHANGE UP (ref 0.5–1.3)
DIFF PNL FLD: NEGATIVE — SIGNIFICANT CHANGE UP
EGFR: 81 ML/MIN/1.73M2 — SIGNIFICANT CHANGE UP
EOSINOPHIL # BLD AUTO: 0.01 K/UL — SIGNIFICANT CHANGE UP (ref 0–0.5)
EOSINOPHIL NFR BLD AUTO: 0.4 % — SIGNIFICANT CHANGE UP (ref 0–6)
GLUCOSE SERPL-MCNC: 104 MG/DL — HIGH (ref 70–99)
GLUCOSE UR QL: NEGATIVE MG/DL — SIGNIFICANT CHANGE UP
HCT VFR BLD CALC: 36.9 % — SIGNIFICANT CHANGE UP (ref 34.5–45)
HGB BLD-MCNC: 11.5 G/DL — SIGNIFICANT CHANGE UP (ref 11.5–15.5)
IMM GRANULOCYTES NFR BLD AUTO: 0 % — SIGNIFICANT CHANGE UP (ref 0–0.9)
KETONES UR-MCNC: NEGATIVE MG/DL — SIGNIFICANT CHANGE UP
LEUKOCYTE ESTERASE UR-ACNC: NEGATIVE — SIGNIFICANT CHANGE UP
LYMPHOCYTES # BLD AUTO: 0.64 K/UL — LOW (ref 1–3.3)
LYMPHOCYTES # BLD AUTO: 24.8 % — SIGNIFICANT CHANGE UP (ref 13–44)
MCHC RBC-ENTMCNC: 27.8 PG — SIGNIFICANT CHANGE UP (ref 27–34)
MCHC RBC-ENTMCNC: 31.2 GM/DL — LOW (ref 32–36)
MCV RBC AUTO: 89.3 FL — SIGNIFICANT CHANGE UP (ref 80–100)
MONOCYTES # BLD AUTO: 0.28 K/UL — SIGNIFICANT CHANGE UP (ref 0–0.9)
MONOCYTES NFR BLD AUTO: 10.9 % — SIGNIFICANT CHANGE UP (ref 2–14)
NEUTROPHILS # BLD AUTO: 1.63 K/UL — LOW (ref 1.8–7.4)
NEUTROPHILS NFR BLD AUTO: 63.1 % — SIGNIFICANT CHANGE UP (ref 43–77)
NITRITE UR-MCNC: NEGATIVE — SIGNIFICANT CHANGE UP
NRBC # BLD: 0 /100 WBCS — SIGNIFICANT CHANGE UP (ref 0–0)
PH UR: 5.5 — SIGNIFICANT CHANGE UP (ref 5–8)
PLATELET # BLD AUTO: 201 K/UL — SIGNIFICANT CHANGE UP (ref 150–400)
POTASSIUM SERPL-MCNC: 3.6 MMOL/L — SIGNIFICANT CHANGE UP (ref 3.5–5.3)
POTASSIUM SERPL-SCNC: 3.6 MMOL/L — SIGNIFICANT CHANGE UP (ref 3.5–5.3)
PROT SERPL-MCNC: 6.7 G/DL — SIGNIFICANT CHANGE UP (ref 6–8.3)
PROT UR-MCNC: NEGATIVE MG/DL — SIGNIFICANT CHANGE UP
RBC # BLD: 4.13 M/UL — SIGNIFICANT CHANGE UP (ref 3.8–5.2)
RBC # FLD: 14.5 % — SIGNIFICANT CHANGE UP (ref 10.3–14.5)
SODIUM SERPL-SCNC: 141 MMOL/L — SIGNIFICANT CHANGE UP (ref 135–145)
SP GR SPEC: 1.02 — SIGNIFICANT CHANGE UP (ref 1–1.03)
UROBILINOGEN FLD QL: 0.2 MG/DL — SIGNIFICANT CHANGE UP (ref 0.2–1)
WBC # BLD: 2.58 K/UL — LOW (ref 3.8–10.5)
WBC # FLD AUTO: 2.58 K/UL — LOW (ref 3.8–10.5)

## 2024-03-20 PROCEDURE — 99283 EMERGENCY DEPT VISIT LOW MDM: CPT | Mod: 25

## 2024-03-20 PROCEDURE — 36415 COLL VENOUS BLD VENIPUNCTURE: CPT

## 2024-03-20 PROCEDURE — 80053 COMPREHEN METABOLIC PANEL: CPT

## 2024-03-20 PROCEDURE — 85025 COMPLETE CBC W/AUTO DIFF WBC: CPT

## 2024-03-20 PROCEDURE — 81003 URINALYSIS AUTO W/O SCOPE: CPT

## 2024-03-20 PROCEDURE — 99284 EMERGENCY DEPT VISIT MOD MDM: CPT

## 2024-03-20 RX ORDER — ACETAMINOPHEN 500 MG
650 TABLET ORAL ONCE
Refills: 0 | Status: COMPLETED | OUTPATIENT
Start: 2024-03-20 | End: 2024-03-20

## 2024-03-20 RX ADMIN — Medication 650 MILLIGRAM(S): at 12:24

## 2024-03-20 NOTE — ED ADULT NURSE NOTE - OBJECTIVE STATEMENT
pt coming from home, non compliant with dementia medications and having increased confusion, thinking people are robbing her. Pt lives with daughter who was reporting the forgetfulness. Pt also endorsing neck pain on/off.
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
## 2024-03-20 NOTE — ED PROVIDER NOTE - PHYSICAL EXAMINATION
She came afebrile, hemodynamically stable, saturating well on room air  NAD, well appearing, sitting comfortably in bed, no WOB, speaking full sentences  Head NCAT, neck supple/full ROM, no neck swelling or asymmetry, no C spine TTP/stepoff/deformity  EOMI, anicteric  MMM  RRR, nml S1/S2, no m/r/g  Lungs CTAB, no w/r/r  Abd soft, NT, ND, nml BS, no rebound or guarding  AAO, CN's 3-12 intact, motor 5/5 in all extremities  BARR spontaneously, no leg cyanosis or edema  Skin warm, well perfused, no rashes or hives

## 2024-03-20 NOTE — ED PROVIDER NOTE - CLINICAL SUMMARY MEDICAL DECISION MAKING FREE TEXT BOX
Patient with no acute mental status change. Character low suspicion for CVA, and no focal or localizing findings. No evidence of neck trauma. No fever or specific infectious symptoms, and UA __________. Labs _________. Daughter states she is able to care for her mother at home and does not want placement. Patient with no acute mental status change. Character low suspicion for CVA, and no focal or localizing findings. No evidence of neck trauma. No fever or specific infectious symptoms, and UA unremarkable. Labs unremarkable. Daughter states she is able to care for her mother at home and does not want placement. Patient is well appearing, NAD, afebrile, hemodynamically stable. Any available tests and studies were discussed with patient and daughter now at bedside. Discharged with instructions in further symptomatic care, return precautions, and need for PMD f/u.

## 2024-03-20 NOTE — ED PROVIDER NOTE - PATIENT PORTAL LINK FT
You can access the FollowMyHealth Patient Portal offered by [facility] by registering at the following website: [email or web address] By joining [facility]’s FollowMyHealth portal, you will also be able to view your health information using other applications (apps) compatible with our system.

## 2024-03-20 NOTE — ED ADULT NURSE NOTE - NSFALLUNIVINTERV_ED_ALL_ED
Bed/Stretcher in lowest position, wheels locked, appropriate side rails in place/Call bell, personal items and telephone in reach/Instruct patient to call for assistance before getting out of bed/chair/stretcher/Non-slip footwear applied when patient is off stretcher/[place] to call system/Physically safe environment - no spills, clutter or unnecessary equipment/Purposeful proactive rounding/Room/bathroom lighting operational, light cord in reach

## 2024-03-20 NOTE — ED ADULT NURSE NOTE - ED STAT RN HANDOFF DETAILS
Patient medically cleared for discharge as per MD order. All discharge instructions and follow-up visits provided to pt, pt verbalized understanding, leaving ambulatory in no acute distress.

## 2024-03-20 NOTE — ED ADULT TRIAGE NOTE - BEFAST SCREENING
Chuyita Agudelo (:  1990) is a 28 y.o. female,Established patient, here for evaluation of the following chief complaint(s):  Skin Problem (Pt has an area on the back of her right leg where her prosthetic has been rubbing)         ASSESSMENT/PLAN:  1. Bacterial folliculitis  -     cephALEXin (KEFLEX) 500 MG capsule; Take 1 capsule by mouth 3 times daily for 10 days, Disp-30 capsule, R-0Normal  Patient has an acute folliculitis. Is most likely due to her prosthetic leg rubbing on her leg. Area is red or warm red swollen. Both sites were right next to each other. Both were drained appropriately with I&D using a clean and sterile technique. Patient tolerated procedure well with office today. Patient was educated on further care. Patient was given triple antibiotic cream.  Patient will also complete antibiotics listed above. Patient is agreeable understands plan at this time. Patient was educated to follow-up if symptoms get worse or do not improve. Patient is agreeable and understands plan at this time          Subjective   SUBJECTIVE/OBJECTIVE:  HPI  Patient presents the office today as a same-day acute visit for right behind the knee. She states that it is right where her prosthetic rubs her leg. She states that it has been warm red and swollen and also started draining over the last few days. She states that it is gotten better since it started draining. Patient denies any fever or chills. Patient states that she is currently just been using Neosporin and trying to limit her use of her prosthetic although it is hard since she is a double amputee and makes it difficult for her to get around with our prosthetics. Patient has no other acute complaints at this time    Review of Systems   HENT:  Negative for congestion and sore throat. Respiratory:  Negative for cough, shortness of breath and wheezing. Cardiovascular:  Negative for chest pain and leg swelling.    Gastrointestinal:  Negative for abdominal pain, blood in stool, constipation and diarrhea. Endocrine: Negative for cold intolerance and heat intolerance. Genitourinary:  Negative for difficulty urinating, hematuria and urgency. Musculoskeletal:  Negative for arthralgias and gait problem. Skin:  Positive for wound. Negative for color change. Neurological:  Negative for dizziness, seizures, light-headedness and headaches. Psychiatric/Behavioral:  Negative for agitation and confusion. The patient is not nervous/anxious. Objective   Physical Exam  Constitutional:       General: She is not in acute distress. Appearance: Normal appearance. HENT:      Right Ear: Tympanic membrane normal.      Left Ear: Tympanic membrane normal.   Eyes:      Pupils: Pupils are equal, round, and reactive to light. Cardiovascular:      Rate and Rhythm: Normal rate and regular rhythm. Pulmonary:      Effort: Pulmonary effort is normal.      Breath sounds: Normal breath sounds. Abdominal:      General: Abdomen is flat. Bowel sounds are normal.      Palpations: Abdomen is soft. Musculoskeletal:         General: Normal range of motion. Skin:     General: Skin is warm. Comments: Patient has 2 infected hair follicles on the back of the leg   Neurological:      General: No focal deficit present. Mental Status: She is alert. Psychiatric:         Mood and Affect: Mood normal.         Behavior: Behavior normal.         Judgment: Judgment normal.          This note was generated completely or in part utilizing Dragon dictation speech recognition software. Occasionally, words are mistranscribed and despite editing, the text may contain inaccuracies due to incorrect word recognition. If further clarification is needed please contact the office at [phone]. An electronic signature was used to authenticate this note.     --YVETTE Carreon - CNP Negative
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
## 2024-09-02 NOTE — OB HISTORY
[Total Preg ___] : : [unfilled] [Vaginal ___] : [unfilled] vaginal delivery(s) [AB Spont ___] : [unfilled] miscarriage(s) [Definite ___ (Date)] : the last menstrual period was [unfilled] [Menarche Age ____] : age at menarche was [unfilled] [Menopause  Age ____] : menopause occurred at age [unfilled] BUE and BLE Generally Weak 4-/5

## 2025-02-24 ENCOUNTER — APPOINTMENT (OUTPATIENT)
Dept: UROLOGY | Facility: CLINIC | Age: 75
End: 2025-02-24

## 2025-04-25 NOTE — H&P ADULT - PROBLEM SELECTOR PLAN 2
58-year-old female history of high cholesterol, hypothyroidism with daughter at bedside chief complaint of abdominal pain and hematuria for 2 days. In ED patient found to have 5mm proximal RT renal stone. UA with trace LE and WBC, treated with ceftriaxone. patient accepted to CDU for pain control and urology consult
IIIC1 gr1 endometrial cancer 10/1/19 s/p PITO, pelvic EBRT and vaginal brachytherapy (completed 5/14/2020), Carboplatin/Taxol x 3 (completed 6/20/2020),   c/f possible malignancy recurrence vs. brachyradiotherapy associated irritation  - f/u CT scan